# Patient Record
Sex: MALE | Race: OTHER | URBAN - METROPOLITAN AREA
[De-identification: names, ages, dates, MRNs, and addresses within clinical notes are randomized per-mention and may not be internally consistent; named-entity substitution may affect disease eponyms.]

---

## 2022-01-25 ENCOUNTER — TELEPHONE (OUTPATIENT)
Dept: FAMILY MEDICINE CLINIC | Age: 42
End: 2022-01-25

## 2022-01-25 NOTE — TELEPHONE ENCOUNTER
----- Message from Christine Chang sent at 2022  5:53 PM EST -----  Subject: Appointment Request    Reason for Call: New Patient Request Appointment    QUESTIONS  Type of Appointment? New Patient/New to Provider  Reason for appointment request? No appointments available during search  Additional Information for Provider? pt would like to get established as   pt at this practice please advise  ---------------------------------------------------------------------------  --------------  CALL BACK INFO  What is the best way for the office to contact you? OK to leave message on   voicemail  Preferred Call Back Phone Number? [phone]  ---------------------------------------------------------------------------  --------------  SCRIPT ANSWERS  Relationship to Patient? Self  Specialty Confirmation? Primary Care  Is this the first appointment to establish care for a ? No  Have you been diagnosed with, awaiting test results for, or told that you   are suspected of having COVID-19 (Coronavirus)? (If patient has tested   negative or was tested as a requirement for work, school, or travel and   not based on symptoms, answer no)? No  Within the past two weeks have you developed any of the following symptoms   (answer no if symptoms have been present longer than 2 weeks or began   more than 2 weeks ago)? Fever or Chills, Cough, Shortness of breath or   difficulty breathing, Loss of taste or smell, Sore throat, Nasal   congestion, Sneezing or runny nose, Fatigue or generalized body aches   (answer no if pain is specific to a body part e.g. back pain), Diarrhea,   Headache? No  Have you had close contact with someone with COVID-19 in the last 14 days? No  (Service Expert  click yes below to proceed with [facility] As Usual   Scheduling)?  Yes

## 2022-02-01 ENCOUNTER — OFFICE VISIT (OUTPATIENT)
Dept: FAMILY MEDICINE CLINIC | Age: 42
End: 2022-02-01
Payer: COMMERCIAL

## 2022-02-01 VITALS
HEIGHT: 67 IN | TEMPERATURE: 98.2 F | WEIGHT: 175.2 LBS | BODY MASS INDEX: 27.5 KG/M2 | HEART RATE: 100 BPM | DIASTOLIC BLOOD PRESSURE: 91 MMHG | OXYGEN SATURATION: 98 % | SYSTOLIC BLOOD PRESSURE: 121 MMHG

## 2022-02-01 DIAGNOSIS — E11.9 TYPE 2 DIABETES MELLITUS WITHOUT COMPLICATION, UNSPECIFIED WHETHER LONG TERM INSULIN USE (HCC): Primary | ICD-10-CM

## 2022-02-01 DIAGNOSIS — Z87.891 PERSONAL HISTORY OF TOBACCO USE, PRESENTING HAZARDS TO HEALTH: ICD-10-CM

## 2022-02-01 LAB — HBA1C MFR BLD: 8.4 %

## 2022-02-01 PROCEDURE — 83036 HEMOGLOBIN GLYCOSYLATED A1C: CPT | Performed by: FAMILY MEDICINE

## 2022-02-01 PROCEDURE — 99406 BEHAV CHNG SMOKING 3-10 MIN: CPT | Performed by: FAMILY MEDICINE

## 2022-02-01 PROCEDURE — 99203 OFFICE O/P NEW LOW 30 MIN: CPT | Performed by: FAMILY MEDICINE

## 2022-02-01 PROCEDURE — 3052F HG A1C>EQUAL 8.0%<EQUAL 9.0%: CPT | Performed by: FAMILY MEDICINE

## 2022-02-01 RX ORDER — SEMAGLUTIDE 1.34 MG/ML
INJECTION, SOLUTION SUBCUTANEOUS
Qty: 1.5 ML | Refills: 3 | Status: SHIPPED
Start: 2022-02-01 | End: 2022-07-11 | Stop reason: SDUPTHER

## 2022-02-01 RX ORDER — ROSUVASTATIN CALCIUM 10 MG/1
10 TABLET, COATED ORAL DAILY
COMMUNITY
End: 2022-09-19

## 2022-02-01 RX ORDER — SEMAGLUTIDE 1.34 MG/ML
INJECTION, SOLUTION SUBCUTANEOUS
COMMUNITY
Start: 2021-11-17 | End: 2022-02-01 | Stop reason: SDUPTHER

## 2022-02-01 RX ORDER — ATORVASTATIN CALCIUM 20 MG/1
20 TABLET, FILM COATED ORAL DAILY
Qty: 30 TABLET | Refills: 3 | Status: SHIPPED
Start: 2022-02-01 | End: 2022-11-01 | Stop reason: SDUPTHER

## 2022-02-01 SDOH — ECONOMIC STABILITY: FOOD INSECURITY: WITHIN THE PAST 12 MONTHS, YOU WORRIED THAT YOUR FOOD WOULD RUN OUT BEFORE YOU GOT MONEY TO BUY MORE.: NEVER TRUE

## 2022-02-01 SDOH — ECONOMIC STABILITY: FOOD INSECURITY: WITHIN THE PAST 12 MONTHS, THE FOOD YOU BOUGHT JUST DIDN'T LAST AND YOU DIDN'T HAVE MONEY TO GET MORE.: NEVER TRUE

## 2022-02-01 ASSESSMENT — PATIENT HEALTH QUESTIONNAIRE - PHQ9
SUM OF ALL RESPONSES TO PHQ QUESTIONS 1-9: 0
SUM OF ALL RESPONSES TO PHQ9 QUESTIONS 1 & 2: 0
SUM OF ALL RESPONSES TO PHQ QUESTIONS 1-9: 0
DEPRESSION UNABLE TO ASSESS: FUNCTIONAL CAPACITY MOTIVATION LIMITS ACCURACY
SUM OF ALL RESPONSES TO PHQ QUESTIONS 1-9: 0
1. LITTLE INTEREST OR PLEASURE IN DOING THINGS: 0
2. FEELING DOWN, DEPRESSED OR HOPELESS: 0
SUM OF ALL RESPONSES TO PHQ QUESTIONS 1-9: 0

## 2022-02-01 ASSESSMENT — ENCOUNTER SYMPTOMS
VOMITING: 0
SORE THROAT: 0
TROUBLE SWALLOWING: 0
CONSTIPATION: 0
NAUSEA: 0
DIARRHEA: 0
ABDOMINAL PAIN: 0
COUGH: 0

## 2022-02-01 ASSESSMENT — SOCIAL DETERMINANTS OF HEALTH (SDOH): HOW HARD IS IT FOR YOU TO PAY FOR THE VERY BASICS LIKE FOOD, HOUSING, MEDICAL CARE, AND HEATING?: NOT HARD AT ALL

## 2022-02-01 NOTE — PROGRESS NOTES
[facility]  Family Medicine Residency  Phone: [phone]  Fax: [phone]    Patient:  David Vanegas 37 y.o. male                                 Date of Service: 2/1/22                            Chiefcomplaint:   Chief Complaint   Patient presents with    Establish Care       History of Present Illness: The patient is a 37 y.o. male  presented to the clinic with complaints as above. Here to establish  Recently moved from [place]     Hx of DM 2( dx in 2018)  On metformin 1000 mg  BID  Rosuvastatin 10 mg   And ozempic  He stated he was being noin-compliant with all his meds with all moving cities for last 2 months. Hx of asthma  Only on ventolin   Uses once monthly or even less     Works as    fhx : dm in mom and sister, seizures in mom  social drinker and social smoker( however stated he recently has been smoking more than his usual about 2 cig daily, since last month     Review of Systems:   Review of Systems   Constitutional: Negative for chills and fever. HENT: Negative for congestion, sore throat and trouble swallowing. Respiratory: Negative for cough. Cardiovascular: Negative for chest pain and leg swelling. Gastrointestinal: Negative for abdominal pain, constipation, diarrhea, nausea and vomiting. Genitourinary: Negative for difficulty urinating. Musculoskeletal: Negative for arthralgias and myalgias. Skin: Negative for rash and wound. Neurological: Negative for dizziness and headaches. Psychiatric/Behavioral: Negative for agitation. Past Medical History:  History reviewed. No pertinent past medical history. Past Surgical History:    History reviewed. No pertinent surgical history. Allergies:    Patient has no known allergies.     Social History:   Social History     Socioeconomic History    Marital status:      Spouse name: Not on file    Number of children: Not on file    Years of education: Not on file    Highest education level: Not on file   Occupational History    Not on file   Tobacco Use    Smoking status: Current Some Day Smoker     Packs/day: 0.25     Types: Cigarettes    Smokeless tobacco: Never Used   Substance and Sexual Activity    Alcohol use: Not Currently    Drug use: Never    Sexual activity: Not on file   Other Topics Concern    Not on file   Social History Narrative    Not on file     Social Determinants of Health     Financial Resource Strain: Low Risk     Difficulty of Paying Living Expenses: Not hard at all   Food Insecurity: No Food Insecurity    Worried About [address] in the Last Year: Never true    [facility] in the Last Year: Never true   Transportation Needs:     Lack of Transportation (Medical): Not on file    Lack of Transportation (Non-Medical): Not on file   Physical Activity:     Days of Exercise per Week: Not on file    Minutes of Exercise per Session: Not on file   Stress:     Feeling of Stress : Not on file   Social Connections:     Frequency of Communication with Friends and Family: Not on file    Frequency of Social Gatherings with Friends and Family: Not on file    Attends Protestant Services: Not on file    Active Member of [address] [place] [address] or Organizations: Not on file    Attends Club or Organization Meetings: Not on file    Marital Status: Not on file   Intimate Partner Violence:     Fear of Current or Ex-Partner: Not on file    Emotionally Abused: Not on file    Physically Abused: Not on file    Sexually Abused: Not on file   Housing Stability:     Unable to Pay for Housing in the Last Year: Not on file    Number of [place] in the Last Year: Not on file    Unstable Housing in the Last Year: Not on file        Family History:   History reviewed. No pertinent family history.     Physical Exam:    Vitals: BP (!) 121/91   Pulse 100   Temp 98.2 °F (36.8 °C)   Ht 5' 7\" (1.702 m)   Wt 175 lb 3.2 oz (79.5 kg)   SpO2 98%   BMI 27.44 kg/m²   BP Readings from Last 3 Encounters:   02/01/22 (!) 121/91     General Appearance: Well developed, awake, alert, oriented, no acute distress  HEENT: Normocephalic,atraumatic. PERRL, EOM's intact, EAC without erythema or swelling, no pallor or icterus. Neck: Supple, symmetrical, trachea midline. No JVD. Chest wall/Lung: Clear to auscultation  bilaterally,  respirations unlabored. No ronchi/wheezing/rales  Heart[de-identified]  Regular rate and rhythm, S1and S2 normal, no murmur, rub or gallop. Abdomen: Soft, non-tender, bowel sounds normoactive, no masses, no organomegaly  Extremities:  Extremities normal, atraumatic, no cyanosis. edema. Skin: Skin color, texture, turgor normal, no rashes or lesions  Musculokeletal: ROM grossly normal in all joints of extremities, no obvious joint swelling. Lymph nodes: no lymph node enlargement appreciated  Neurologic:   Alert&Oriented. Normal gait and coordination  No focal neurological deficits appreaciated         Psychiatric: has a normal mood and affect. Behavior is normal.       Assessment and Plan:         1. Type 2 diabetes mellitus without complication, unspecified whether long term insulin use (HCC)  Will do some labs today  Will refill metformin, ozempic   Will change crestor to lipitor 20 mg today  A1C : 8.4( previous was 6.7)   Pt encouraged to check his glucose at home and log readings.   - POCT glycosylated hemoglobin (Hb A1C)  - LIPID PANEL; Future  - COMPREHENSIVE METABOLIC PANEL; Future  - MICROALBUMIN / CREATININE URINE RATIO; Future    2. Personal history of tobacco use, presenting hazards to health  Pt counseled and offered resources available   Pt is willing to take steps and wean   - DE TOBACCO USE CESSATION INTERMEDIATE 3-10 MINUTES [16512]      Return to Office: Return in about 3 months (around 5/1/2022) for DM check. I encourage further reading and education about your health conditions.   Information on many healthconditions is provided by the American Academy of Family Physicians: https://familydoctor. org/  Please bring any questions to me at your next visit. This document may have been prepared at least partiallythrough the use of voice recognition software. Although effort is taken to assure the accuracy of this document, it is possible that grammatical, syntax,  or spelling errors may occur. Medication List:    Current Outpatient Medications   Medication Sig Dispense Refill    rosuvastatin (CRESTOR) 10 MG tablet Take 10 mg by mouth daily      metFORMIN (GLUCOPHAGE) 1000 MG tablet Take 1 tablet by mouth daily (with breakfast) 60 tablet 0    atorvastatin (LIPITOR) 20 MG tablet Take 1 tablet by mouth daily 30 tablet 3    OZEMPIC, 0.25 OR 0.5 MG/DOSE, 2 MG/1.5ML SOPN ADMINISTER 0.25 MG UNDER THE SKIN ONCE A WEEK 1.5 mL 3     No current facility-administered medications for this visit.         Aki Lujan MD

## 2022-02-01 NOTE — PROGRESS NOTES
[facility]  Precepting Note    Subjective:  Moved to area    Diabetes mellitus  Follow-up  Metformin and ozempic but has been less compliant with meds lately due to the move. A1c up to 8s from 6s normally. He hasnt really been taking   Lab Results   Component Value Date    LABA1C 8.4 02/01/2022     No results found for: GLUF, LABMICR, LDLCALC, CREATININE  Wt Readings from Last 3 Encounters:   02/01/22 175 lb 3.2 oz (79.5 kg)     Used to take a statin    Asthma  Intermittent  Alb once a month or so   Recently started smoking 2 cigs aday due to stress from moving. . ROS otherwise negative     Past medical, surgical, family and social history were reviewed, non-contributory, and unchanged unless otherwise stated. Objective:    BP (!) 121/91   Pulse 100   Temp 98.2 °F (36.8 °C)   Ht 5' 7\" (1.702 m)   Wt 175 lb 3.2 oz (79.5 kg)   SpO2 98%   BMI 27.44 kg/m²     Exam is as noted by resident with the following changes, additions or corrections:    General:  NAD; alert & oriented x 3   Heart:  RRR, no murmurs, gallops, or rubs. Lungs:  CTA bilaterally, no wheeze, rales or rhonchi  Abd: bowel sounds present, nontender, nondistended, no masses  Extrem:  No clubbing, cyanosis, or edema    Assessment/Plan:  DM  Labs  Encourage compliance with meds. Worsened BS control with inc stresses and a recent move. Add meds back, stepwise.   smoking cessation. Asthma  Stable controlled. Attending Physician Statement  I have reviewed the chart, including any radiology or labs. I have discussed the case, including pertinent history and exam findings with the resident. I agree with the assessment, plan and orders as documented by the resident. Please refer to the resident note for additional information.       Electronically signed by Karine Florez MD on 2/1/2022 at 9:44 AM

## 2022-02-02 ENCOUNTER — NURSE ONLY (OUTPATIENT)
Dept: FAMILY MEDICINE CLINIC | Age: 42
End: 2022-02-02
Payer: COMMERCIAL

## 2022-02-02 DIAGNOSIS — E11.9 TYPE 2 DIABETES MELLITUS WITHOUT COMPLICATION, UNSPECIFIED WHETHER LONG TERM INSULIN USE (HCC): ICD-10-CM

## 2022-02-02 LAB
ALBUMIN SERPL-MCNC: 4.5 G/DL (ref 3.5–5.2)
ALP BLD-CCNC: 66 U/L (ref 40–129)
ALT SERPL-CCNC: 18 U/L (ref 0–40)
ANION GAP SERPL CALCULATED.3IONS-SCNC: 14 MMOL/L (ref 7–16)
AST SERPL-CCNC: 21 U/L (ref 0–39)
BILIRUB SERPL-MCNC: 0.5 MG/DL (ref 0–1.2)
BUN BLDV-MCNC: 12 MG/DL (ref 6–20)
CALCIUM SERPL-MCNC: 9.6 MG/DL (ref 8.6–10.2)
CHLORIDE BLD-SCNC: 102 MMOL/L (ref 98–107)
CHOLESTEROL, TOTAL: 201 MG/DL (ref 0–199)
CO2: 23 MMOL/L (ref 22–29)
CREAT SERPL-MCNC: 1 MG/DL (ref 0.7–1.2)
CREATININE URINE: 209 MG/DL (ref 40–278)
GFR AFRICAN AMERICAN: >60
GFR NON-AFRICAN AMERICAN: >60 ML/MIN/1.73
GLUCOSE BLD-MCNC: 155 MG/DL (ref 74–99)
HDLC SERPL-MCNC: 47 MG/DL
LDL CHOLESTEROL CALCULATED: 113 MG/DL (ref 0–99)
MICROALBUMIN UR-MCNC: 36.4 MG/L
MICROALBUMIN/CREAT UR-RTO: 17.4 (ref 0–30)
POTASSIUM SERPL-SCNC: 4.3 MMOL/L (ref 3.5–5)
SODIUM BLD-SCNC: 139 MMOL/L (ref 132–146)
TOTAL PROTEIN: 7.7 G/DL (ref 6.4–8.3)
TRIGL SERPL-MCNC: 206 MG/DL (ref 0–149)
VLDLC SERPL CALC-MCNC: 41 MG/DL

## 2022-02-02 PROCEDURE — 36415 COLL VENOUS BLD VENIPUNCTURE: CPT | Performed by: FAMILY MEDICINE

## 2022-02-02 NOTE — PATIENT INSTRUCTIONS
Stopping Smoking: Care Instructions  Your Care Instructions     Cigarette smokers crave the nicotine in cigarettes. Giving it up is much harder than simply changing a habit. Your body has to stop craving the nicotine. It is hard to quit, but you can do it. There are many tools that people use to quit smoking. You may find that combining tools works best for you. There are several steps to quitting. First you get ready to quit. Then you get support to help you. After that, you learn new skills and behaviors to become a nonsmoker. For many people, a necessary step is getting and using medicine. Your doctor will help you set up the plan that best meets your needs. You may want to attend a smoking cessation program to help you quit smoking. When you choose a program, look for one that has proven success. Ask your doctor for ideas. You will greatly increase your chances of success if you take medicine as well as get counseling or join a cessation program.  Some of the changes you feel when you first quit tobacco are uncomfortable. Your body will miss the nicotine at first, and you may feel short-tempered and grumpy. You may have trouble sleeping or concentrating. Medicine can help you deal with these symptoms. You may struggle with changing your smoking habits and rituals. The last step is the tricky one: Be prepared for the smoking urge to continue for a time. This is a lot to deal with, but keep at it. You will feel better. Follow-up care is a key part of your treatment and safety. Be sure to make and go to all appointments, and call your doctor if you are having problems. It's also a good idea to know your test results and keep a list of the medicines you take. How can you care for yourself at home? · Ask your family, friends, and coworkers for support. You have a better chance of quitting if you have help and support.   · Join a support group, such as Nicotine Anonymous, for people who are trying to quit smoking. · Consider signing up for a smoking cessation program, such as the American Lung Association's Freedom from Smoking program.  · Get text messaging support. Go to the website at www.smokefree. gov to sign up for the [facility] program.  · Set a quit date. Pick your date carefully so that it is not right in the middle of a big deadline or stressful time. Once you quit, do not even take a puff. Get rid of all ashtrays and lighters after your last cigarette. Clean your house and your clothes so that they do not smell of smoke. · Learn how to be a nonsmoker. Think about ways you can avoid those things that make you reach for a cigarette. ? Avoid situations that put you at greatest risk for smoking. For some people, it is hard to have a drink with friends without smoking. For others, they might skip a coffee break with coworkers who smoke. ? Change your daily routine. Take a different route to work or eat a meal in a different place. · Cut down on stress. Calm yourself or release tension by doing an activity you enjoy, such as reading a book, taking a hot bath, or gardening. · Talk to your doctor or pharmacist about nicotine replacement therapy, which replaces the nicotine in your body. You still get nicotine but you do not use tobacco. Nicotine replacement products help you slowly reduce the amount of nicotine you need. These products come in several forms, many of them available over-the-counter:  ? Nicotine patches  ? Nicotine gum and lozenges  ? Nicotine inhaler  · Ask your doctor about bupropion (Wellbutrin) or varenicline (Chantix), which are prescription medicines. They do not contain nicotine. They help you by reducing withdrawal symptoms, such as stress and anxiety. · Some people find hypnosis, acupuncture, and massage helpful for ending the smoking habit. · Eat a healthy diet and get regular exercise. Having healthy habits will help your body move past its craving for nicotine.   · Be prepared to keep trying. Most people are not successful the first few times they try to quit. Do not get mad at yourself if you smoke again. Make a list of things you learned and think about when you want to try again, such as next week, next month, or next year. Where can you learn more? Go to https://chpearlette.VaporWire. org and sign in to your FlyClip account. Enter A514 in the ShopLogic box to learn more about \"Stopping Smoking: Care Instructions. \"     If you do not have an account, please click on the \"Sign Up Now\" link. Current as of: February 11, 2021               Content Version: 13.1  © 2006-2021 Healthwise, Incorporated. Care instructions adapted under license by [facility] ([facility]). If you have questions about a medical condition or this instruction, always ask your healthcare professional. [address] any warranty or liability for your use of this information.

## 2022-02-08 ENCOUNTER — TELEPHONE (OUTPATIENT)
Dept: FAMILY MEDICINE CLINIC | Age: 42
End: 2022-02-08

## 2022-04-25 NOTE — TELEPHONE ENCOUNTER
Received denial for the Ozempic. Plan prefers metformin, attempted prior authorization twice. Denied both times.

## 2022-05-04 NOTE — TELEPHONE ENCOUNTER
Please inform patient to start taking metformin 1000mg BID and try to bring BG logs during his appt. We can discuss that time if need to add another agent since ozempic was not approved.

## 2022-07-11 ENCOUNTER — OFFICE VISIT (OUTPATIENT)
Dept: FAMILY MEDICINE CLINIC | Age: 42
End: 2022-07-11
Payer: COMMERCIAL

## 2022-07-11 VITALS
OXYGEN SATURATION: 98 % | TEMPERATURE: 97.2 F | WEIGHT: 178 LBS | HEART RATE: 90 BPM | HEIGHT: 67 IN | SYSTOLIC BLOOD PRESSURE: 125 MMHG | BODY MASS INDEX: 27.94 KG/M2 | DIASTOLIC BLOOD PRESSURE: 83 MMHG

## 2022-07-11 DIAGNOSIS — E11.9 TYPE 2 DIABETES MELLITUS WITHOUT COMPLICATION, UNSPECIFIED WHETHER LONG TERM INSULIN USE (HCC): Primary | ICD-10-CM

## 2022-07-11 LAB — HBA1C MFR BLD: 11.2 %

## 2022-07-11 PROCEDURE — 3046F HEMOGLOBIN A1C LEVEL >9.0%: CPT | Performed by: FAMILY MEDICINE

## 2022-07-11 PROCEDURE — 83036 HEMOGLOBIN GLYCOSYLATED A1C: CPT | Performed by: FAMILY MEDICINE

## 2022-07-11 PROCEDURE — 99214 OFFICE O/P EST MOD 30 MIN: CPT | Performed by: FAMILY MEDICINE

## 2022-07-11 RX ORDER — SEMAGLUTIDE 1.34 MG/ML
INJECTION, SOLUTION SUBCUTANEOUS
Qty: 1.5 ML | Refills: 3 | Status: SHIPPED
Start: 2022-07-11 | End: 2022-09-19 | Stop reason: SDUPTHER

## 2022-07-11 RX ORDER — LISINOPRIL 5 MG/1
5 TABLET ORAL DAILY
Qty: 90 TABLET | Refills: 1 | Status: SHIPPED
Start: 2022-07-11 | End: 2022-09-19

## 2022-07-11 ASSESSMENT — ENCOUNTER SYMPTOMS
VOMITING: 0
ABDOMINAL PAIN: 0
NAUSEA: 0
SORE THROAT: 0
CONSTIPATION: 0
COUGH: 0
DIARRHEA: 0
TROUBLE SWALLOWING: 0

## 2022-07-11 NOTE — PROGRESS NOTES
7/11/2022    Tracy Rodriguez is a 37 y.o. malehere for:    HPI:    Here for DM fu  Wife present  Pt not taking any meds at home - metformin   Pt is not taking statin as well  A1C today 11.4 which has worsened from last time   In the past he preferred to stay on metformin  No blurry vision   No polyuria  Patient has been experiencing increasing thirst  Hx of suicidal thoughts with Chrystine Mings worked well in the past  Insurance did not work ozempic  Do not want insulin at this time   lantus did not work in the past      BP Readings from Last 3 Encounters:   07/11/22 125/83   02/01/22 (!) 121/91     Current Outpatient Medications   Medication Sig Dispense Refill    OZEMPIC, 0.25 OR 0.5 MG/DOSE, 2 MG/1.5ML SOPN ADMINISTER 0.5 MG UNDER THE SKIN ONCE A WEEK 1.5 mL 3    lisinopril (PRINIVIL;ZESTRIL) 5 MG tablet Take 1 tablet by mouth daily 90 tablet 1    rosuvastatin (CRESTOR) 10 MG tablet Take 10 mg by mouth daily      metFORMIN (GLUCOPHAGE) 1000 MG tablet Take 1 tablet by mouth daily (with breakfast) 60 tablet 0    atorvastatin (LIPITOR) 20 MG tablet Take 1 tablet by mouth daily (Patient not taking: Reported on 7/11/2022) 30 tablet 3     No current facility-administered medications for this visit. No Known Allergies    Past Medical & Surgical History:  No past medical history on file. No past surgical history on file. Family History:  No family history on file. Social History:  Social History     Tobacco Use    Smoking status: Current Some Day Smoker     Packs/day: 0.25     Types: Cigarettes    Smokeless tobacco: Never Used   Substance Use Topics    Alcohol use: Not Currently         There is no immunization history on file for this patient. Review of Systems   Constitutional: Negative for chills and fever. HENT: Negative for congestion, sore throat and trouble swallowing. Respiratory: Negative for cough. Cardiovascular: Negative for chest pain and leg swelling. Gastrointestinal: Negative for abdominal pain, constipation, diarrhea, nausea and vomiting. Endocrine: Positive for polydipsia. Genitourinary: Negative for difficulty urinating. Musculoskeletal: Negative for arthralgias and myalgias. Skin: Negative for rash and wound. Neurological: Negative for dizziness and headaches. Psychiatric/Behavioral: Negative for agitation. VS:  /83   Pulse 90   Temp 97.2 °F (36.2 °C)   Ht 5' 7\" (1.702 m)   Wt 178 lb (80.7 kg)   SpO2 98%   BMI 27.88 kg/m²     Physical Exam  Constitutional:       Appearance: Normal appearance. HENT:      Head: Normocephalic. Nose: Nose normal. No rhinorrhea. Eyes:      General: No scleral icterus. Conjunctiva/sclera: Conjunctivae normal.   Cardiovascular:      Rate and Rhythm: Normal rate and regular rhythm. Pulses: Normal pulses. Heart sounds: Normal heart sounds. No murmur heard. Pulmonary:      Breath sounds: Normal breath sounds. No wheezing, rhonchi or rales. Abdominal:      General: Abdomen is flat. Bowel sounds are normal.   Musculoskeletal:      Right lower leg: No edema. Left lower leg: No edema. Skin:     General: Skin is warm. Findings: No rash. Comments:  monofilament negative   Neurological:      General: No focal deficit present. Mental Status: He is alert. Assessment/Plan:  1.  Type 2 diabetes mellitus without complication, unspecified whether long term insulin use (HCC)  Discussed in detail the importance of medication compliance  Discussed applications of diabetes  A1c 11.4  At this time patient is poorly controlled  Patient has not taking any of the meds prescribed  We will try another prior Auth for Ozempic because that is patient's preference at this time  Patient does not want to try Lantus  Monofilament negative today  We will add lisinopril 5 mg for diabetic nephropathy  Encouraged importance of statins since diabetes can be equivalent of coronary artery disease  Patient is due for eye exam  No refills today  Patient has plenty of supplies at home to check sugar    - POCT glycosylated hemoglobin (Hb A1C)      Follow up: We will follow-up in 4 to 6 weeks depending on when ozempic gets approved    Patient agrees with the above stated plan. Kenney received counseling on the following healthy behaviors: nutrition, exercise and medication adherence.     Tommy Davis MD  PGY-3 Family Medicine

## 2022-07-11 NOTE — PROGRESS NOTES
[facility]  [facility]    Subjective:  F/u of DM2- A1C 11.2  On metformin- not taking  Ozempic not covered  Victoza- didn't tolerate  Not taking Statin    ROS otherwise negative     Past medical, surgical, family and social history were reviewed, non-contributory, and unchanged unless otherwise stated. Objective:    /83   Pulse 90   Temp 97.2 °F (36.2 °C)   Ht 5' 7\" (1.702 m)   Wt 178 lb (80.7 kg)   SpO2 98%   BMI 27.88 kg/m²     Exam is as noted by resident with the following changes, additions or corrections:    General:  NAD; alert & oriented x 3   Heart:  RRR, no murmurs, gallops, or rubs. Lungs:  CTA bilaterally, no wheeze, rales or rhonchi  Abd: bowel sounds present, nontender, nondistended, no masses  Extrem:  No clubbing, cyanosis, or edema    Assessment/Plan:  Dm2, uncontrolled. Restart metformin  Microalbuminuria. Start ACEI  Prior auth of Ozempic  Restart Statin  Monitor glucose  F/u as instructed       Attending Physician Statement  I have reviewed the chart, including any radiology or labs. I have discussed the case, including pertinent history and exam findings with the resident. I agree with the assessment, plan and orders as documented by the resident. Please refer to the resident note for additional information.       Electronically signed by Serge Oneill MD on 7/11/2022 at 2:42 PM

## 2022-07-15 ENCOUNTER — TELEPHONE (OUTPATIENT)
Dept: FAMILY MEDICINE CLINIC | Age: 42
End: 2022-07-15

## 2022-07-15 NOTE — TELEPHONE ENCOUNTER
Called pt to inform him that his wife is not listed in his chart as an emergency contact and we do not have a communication release form. With the language barrier he gave me verbal permission to speak with her about his medications for PA and when he comes in next he will fill out the form. Wife will be calling me back.

## 2022-07-15 NOTE — TELEPHONE ENCOUNTER
Patient's wife stopped in office to ask about prior auth for patient to have [address]. Patient has tried all other substitutions and would like to continue taking only Ozempic. She provided a phone number for auth [id number].

## 2022-07-20 NOTE — TELEPHONE ENCOUNTER
Had submitted a PA on 7/12/22; called today to check the status of the authorization. They stated they did not receive my fax or prior auth info.      Upon further checking no authorization was needed and they indicated that they showed a paid claim and that the patient had picked up this medication on 7/15/2022 and patient will be eligible for his refill on 8/8/2022

## 2022-08-02 RX ORDER — FLASH GLUCOSE SENSOR
1 KIT MISCELLANEOUS
Qty: 6 EACH | Refills: 3 | Status: SHIPPED
Start: 2022-08-02 | End: 2022-11-01 | Stop reason: SDUPTHER

## 2022-08-02 RX ORDER — FLASH GLUCOSE SCANNING READER
1 EACH MISCELLANEOUS CONTINUOUS
Qty: 1 EACH | Refills: 0 | Status: SHIPPED
Start: 2022-08-02 | End: 2022-09-19

## 2022-08-02 NOTE — TELEPHONE ENCOUNTER
Patient's wife called stating that she would like the patient to get a [employer] CGM system. I explained that typically the insurance only covers if the patient is on insulin 3 times a day. She would like to try anyway. She also stated that she started the patient back on Toujeo 20 units nightly (not sure who prescribed that) She said she had it at home.      Pended the CGM system

## 2022-09-02 ENCOUNTER — OFFICE VISIT (OUTPATIENT)
Dept: FAMILY MEDICINE CLINIC | Age: 42
End: 2022-09-02
Payer: COMMERCIAL

## 2022-09-02 VITALS
DIASTOLIC BLOOD PRESSURE: 85 MMHG | HEIGHT: 67 IN | TEMPERATURE: 97.4 F | SYSTOLIC BLOOD PRESSURE: 136 MMHG | BODY MASS INDEX: 25.74 KG/M2 | HEART RATE: 96 BPM | WEIGHT: 164 LBS | OXYGEN SATURATION: 100 %

## 2022-09-02 DIAGNOSIS — E11.9 TYPE 2 DIABETES MELLITUS WITHOUT COMPLICATION, UNSPECIFIED WHETHER LONG TERM INSULIN USE (HCC): Primary | ICD-10-CM

## 2022-09-02 PROCEDURE — 3046F HEMOGLOBIN A1C LEVEL >9.0%: CPT | Performed by: FAMILY MEDICINE

## 2022-09-02 PROCEDURE — 99213 OFFICE O/P EST LOW 20 MIN: CPT | Performed by: FAMILY MEDICINE

## 2022-09-02 NOTE — PROGRESS NOTES
9/2/2022    Tracy Rodriguez is a 37 y.o. malehere for:    HPI:    Here for DM fu  Does not check blood glucose at home   He states his fasting runs around 120  He also reports his random blood glucose levels are around 160   Denies any sympt of polyuria, polydipsia,hypoglycemia    Uses Ozempic 0.5 weekly  Using Toujeo 10 U night from [place] daily , initially he was using 20 U and now has been using 10 U  Also taking metformin 1000mg bid  He said he is skipping statin dose since he said he is trying to do more physical activity  Lisinopril : not taking, since he thinks \" he is taking too many meds\" he said he plans not taking it      BP Readings from Last 3 Encounters:   09/02/22 136/85   07/11/22 125/83   02/01/22 (!) 121/91     Current Outpatient Medications   Medication Sig Dispense Refill    Continuous Blood Gluc Sensor (FREESTYLE ESTRELLA 2 SENSOR) MISC 1 each by Does not apply route every 14 days 6 each 3    Continuous Blood Gluc  (FREESTYLE ESTRELLA 2 READER) CARLA 1 each by Does not apply route continuous 1 each 0    OZEMPIC, 0.25 OR 0.5 MG/DOSE, 2 MG/1.5ML SOPN ADMINISTER 0.5 MG UNDER THE SKIN ONCE A WEEK 1.5 mL 3    rosuvastatin (CRESTOR) 10 MG tablet Take 10 mg by mouth daily      metFORMIN (GLUCOPHAGE) 1000 MG tablet Take 1 tablet by mouth daily (with breakfast) 60 tablet 0    lisinopril (PRINIVIL;ZESTRIL) 5 MG tablet Take 1 tablet by mouth daily (Patient not taking: Reported on 9/2/2022) 90 tablet 1    atorvastatin (LIPITOR) 20 MG tablet Take 1 tablet by mouth daily (Patient not taking: Reported on 7/11/2022) 30 tablet 3     No current facility-administered medications for this visit. No Known Allergies    Past Medical & Surgical History:  No past medical history on file. No past surgical history on file. Family History:  No family history on file.     Social History:  Social History     Tobacco Use    Smoking status: Some Days     Packs/day: 0.25     Types: Cigarettes    Smokeless tobacco: Never   Substance Use Topics    Alcohol use: Not Currently         There is no immunization history on file for this patient. Review of Systems   Constitutional:  Negative for chills and fever. HENT:  Negative for congestion, sore throat and trouble swallowing. Respiratory:  Negative for cough. Cardiovascular:  Negative for chest pain and leg swelling. Gastrointestinal:  Negative for abdominal pain, constipation, diarrhea, nausea and vomiting. Genitourinary:  Negative for difficulty urinating. Musculoskeletal:  Negative for arthralgias and myalgias. Skin:  Negative for rash and wound. Neurological:  Negative for dizziness and headaches. Psychiatric/Behavioral:  Negative for agitation. VS:  /85   Pulse 96   Temp 97.4 °F (36.3 °C) (Temporal)   Ht 5' 7\" (1.702 m)   Wt 164 lb (74.4 kg)   SpO2 100%   BMI 25.69 kg/m²     Physical Exam  Constitutional:       Appearance: Normal appearance. HENT:      Head: Normocephalic. Nose: Nose normal. No rhinorrhea. Eyes:      General: No scleral icterus. Conjunctiva/sclera: Conjunctivae normal.   Cardiovascular:      Rate and Rhythm: Normal rate and regular rhythm. Pulses: Normal pulses. Heart sounds: Normal heart sounds. No murmur heard. Pulmonary:      Breath sounds: Normal breath sounds. No wheezing, rhonchi or rales. Abdominal:      General: Abdomen is flat. Bowel sounds are normal.   Musculoskeletal:      Right lower leg: No edema. Left lower leg: No edema. Skin:     General: Skin is warm. Findings: No rash. Neurological:      General: No focal deficit present. Mental Status: He is alert. Assessment/Plan:  1.  Type 2 diabetes mellitus without complication, unspecified whether long term insulin use (HCC)  Continue toujeo 10 U nightly, (gets it from [place])  Continue metformin 1000mg BID  Continue ozempic   Pt refuses to take statin and lisinopril  Educated importance of statin and lisinopril, pt still refusing   Will schedule for A1C check in few weeks. Follow up:  3 months     Patient agrees with the above stated plan. Kenney received counseling on the following healthy behaviors: nutrition, exercise, and medication adherence.     Eusebio Lane MD  PGY-3 Family Medicine sensation is normal and strength is normal.

## 2022-09-02 NOTE — PROGRESS NOTES
S: 37 y.o. male with   Chief Complaint   Patient presents with    Diabetes     Pt is here for DM follow up. He is checking his sugar and he is well controlled. He does not want to take the ACE or the statin. Doing well on the ozempic. O: VS:  height is 5' 7\" (1.702 m) and weight is 164 lb (74.4 kg). His temporal temperature is 97.4 °F (36.3 °C). His blood pressure is 136/85 and his pulse is 96. His oxygen saturation is 100%. BP Readings from Last 3 Encounters:   09/02/22 136/85   07/11/22 125/83   02/01/22 (!) 121/91     See resident note      Impression/Plan:   1) DM - recheck HbA1c in Oct.  Sugar is better controlled. 2) increased ASCVD risk - ed pt importance of statin. Pt does not want to start at this time. 3) minor micoralbuminuria - not significant at this time. Pt not at goal for BP. Advised ACE. Pt does not want to start at this time. Health Maintenance Due   Topic Date Due    COVID-19 Vaccine (1) Never done    Varicella vaccine (1 of 2 - 2-dose childhood series) Never done    Pneumococcal 0-64 years Vaccine (1 - PCV) Never done    Diabetic foot exam  Never done    HIV screen  Never done    Diabetic retinal exam  Never done    Hepatitis C screen  Never done    Hepatitis B vaccine (1 of 3 - Risk 3-dose series) Never done    DTaP/Tdap/Td vaccine (1 - Tdap) Never done    Flu vaccine (1) Never done         Attending Physician Statement  I have discussed the case, including pertinent history and exam findings with the resident. I agree with the documented assessment and plan.       Martha Vines MD

## 2022-09-07 ASSESSMENT — ENCOUNTER SYMPTOMS
SORE THROAT: 0
VOMITING: 0
ABDOMINAL PAIN: 0
CONSTIPATION: 0
COUGH: 0
TROUBLE SWALLOWING: 0
NAUSEA: 0
DIARRHEA: 0

## 2022-09-19 ENCOUNTER — OFFICE VISIT (OUTPATIENT)
Dept: ENDOCRINOLOGY | Age: 42
End: 2022-09-19
Payer: COMMERCIAL

## 2022-09-19 VITALS
WEIGHT: 164 LBS | SYSTOLIC BLOOD PRESSURE: 126 MMHG | BODY MASS INDEX: 25.69 KG/M2 | HEART RATE: 82 BPM | OXYGEN SATURATION: 99 % | DIASTOLIC BLOOD PRESSURE: 87 MMHG

## 2022-09-19 DIAGNOSIS — E55.9 VITAMIN D DEFICIENCY: Primary | ICD-10-CM

## 2022-09-19 DIAGNOSIS — E11.9 TYPE 2 DIABETES MELLITUS WITHOUT COMPLICATION, UNSPECIFIED WHETHER LONG TERM INSULIN USE (HCC): ICD-10-CM

## 2022-09-19 PROCEDURE — 99203 OFFICE O/P NEW LOW 30 MIN: CPT | Performed by: INTERNAL MEDICINE

## 2022-09-19 PROCEDURE — 3046F HEMOGLOBIN A1C LEVEL >9.0%: CPT | Performed by: INTERNAL MEDICINE

## 2022-09-19 RX ORDER — SEMAGLUTIDE 1.34 MG/ML
INJECTION, SOLUTION SUBCUTANEOUS
Qty: 3 ADJUSTABLE DOSE PRE-FILLED PEN SYRINGE | Refills: 3 | Status: SHIPPED
Start: 2022-09-19 | End: 2022-11-01

## 2022-09-19 RX ORDER — INSULIN GLARGINE 300 U/ML
10 INJECTION, SOLUTION SUBCUTANEOUS DAILY
COMMUNITY
End: 2022-11-01

## 2022-09-19 RX ORDER — METFORMIN HYDROCHLORIDE 500 MG/1
2000 TABLET, EXTENDED RELEASE ORAL
Qty: 120 TABLET | Refills: 11 | Status: SHIPPED
Start: 2022-09-19 | End: 2022-11-01 | Stop reason: SDUPTHER

## 2022-09-19 NOTE — PROGRESS NOTES
[address] [facility] Department of Endocrinology Diabetes and Metabolism   [address] [facility] [address]   Phone: [phone]  Fax: [phone]    Date of Service: 9/19/2022  Primary Care Physician: Juan Antonio Beverly MD  Referring physician: No ref. provider found  Provider: Azra Kumar MD    Reason for the visit:  DM type 2     History of Present Illness: The history is provided by the patient. No  was used. Accuracy of the patient data is excellent. Tracy Rodriguez is a very pleasant 37 y.o. male seen today for diabetes management     Tracy Rodriguez was diagnosed with diabetes at age 40  and currently on Metformin 1000 mg daily, Ozempic 0.5 mg/wk    The patient has been checking blood sugar every few weeks   Most recent A1c results summarized below  Lab Results   Component Value Date/Time    LABA1C 11.2 07/11/2022 02:17 PM    LABA1C 8.4 02/01/2022 09:24 AM     Patient has had no hypoglycemic episodes   The patient hasn't been very mindful of what has been eating and wasn't strictly following diabetic diet   I reviewed current medications and the patient has no issues with diabetes medications  Tracy Rodriguez is up to date with eye exam and denied any history of diabetic retinopathy   The patient  performs  own feet care  Microvascular complications:  No Retinopathy, Nephropathy or Neuropathy   Macrovascular complications: no CAD   The patient receives Flushot every year    PGM and MGM DM     PAST MEDICAL HISTORY   No past medical history on file. PAST SURGICAL HISTORY   No past surgical history on file. SOCIAL HISTORY   Tobacco:   reports that he has been smoking cigarettes. He has been smoking an average of .25 packs per day. He has never used smokeless tobacco.  Alcohol:   reports that he does not currently use alcohol. Drugs:   reports no history of drug use. FAMILY HISTORY   No family history on file.     ALLERGIES AND DRUG REACTIONS   No Known Allergies    CURRENT MEDICATIONS   Current Outpatient Medications   Medication Sig Dispense Refill    Insulin Glargine, 1 Unit Dial, (TOUJEO SOLOSTAR) 300 UNIT/ML SOPN Inject 10 Units into the skin daily      OZEMPIC, 0.25 OR 0.5 MG/DOSE, 2 MG/1.5ML SOPN ADMINISTER 0.5 MG UNDER THE SKIN ONCE A WEEK 1.5 mL 3    metFORMIN (GLUCOPHAGE) 1000 MG tablet Take 1 tablet by mouth daily (with breakfast) 60 tablet 0    atorvastatin (LIPITOR) 20 MG tablet Take 1 tablet by mouth daily 30 tablet 3    Continuous Blood Gluc Sensor (FREESTYLE ESTRELLA 2 SENSOR) MISC 1 each by Does not apply route every 14 days (Patient not taking: Reported on 9/19/2022) 6 each 3    Continuous Blood Gluc  (FREESTYLE ESTRELLA 2 READER) CARLA 1 each by Does not apply route continuous (Patient not taking: Reported on 9/19/2022) 1 each 0     No current facility-administered medications for this visit. Review of Systems  Constitutional: No fever, no chills, no diaphoresis, no generalized weakness. HEENT: No blurred vision, No sore throat, no ear pain, no hair loss  Neck: denied any neck swelling, difficulty swallowing,   Cardio-pulmonary: No CP, SOB or palpitation, No orthopnea or PND. No cough or wheezing. GI: No N/V/D, no constipation, No abdominal pain, no melena or hematochezia   : Denied any dysuria, hematuria, flank pain, discharge, or incontinence. Skin: denied any rash, ulcer, Hirsute, or hyperpigmentation. MSK: denied any joint deformity, joint pain/swelling, muscle pain, or back pain.   Neuro: no numbness, no tingling, no weakness, _    OBJECTIVE    /87   Pulse 82   Wt 164 lb (74.4 kg)   SpO2 99%   BMI 25.69 kg/m²   BP Readings from Last 4 Encounters:   09/19/22 126/87   09/02/22 136/85   07/11/22 125/83   02/01/22 (!) 121/91     Wt Readings from Last 6 Encounters:   09/19/22 164 lb (74.4 kg)   09/02/22 164 lb (74.4 kg)   07/11/22 178 lb (80.7 kg)   02/01/22 175 lb 3.2 oz (79.5 kg)       Physical examination:  General: awake alert, oriented x3, no abnormal position or movements. HEENT: normocephalic non-traumatic, no exophthalmos   Neck: supple, no LN enlargement, no thyromegaly, no thyroid tenderness, no JVD. Pulm: Clear equal air entry no added sounds, no wheezing or rhonchi    CVS: S1 + S2, no murmur, no heave. Dorsalis pedis pulse palpable   Abd: soft lax, no tenderness, no organomegaly, audible bowel sounds. Skin: warm, no lesions, no rash.  No callus, no Ulcers, No acanthosis nigricans  Musculoskeletal: No back tenderness, no kyphosis/scoliosis    Neuro: CN intact, Monofilament sensation present  bilateral , muscle power normal  Psych: normal mood, and affect    Review of Laboratory Data:  I personally reviewed the following lab:  No results found for: WBC, RBC, HGB, HCT, MCV, MCH, MCHC, RDW, PLT, MPV, GRANULOCYTES, SONAL, BANDS   Lab Results   Component Value Date/Time     02/02/2022 12:00 PM    K 4.3 02/02/2022 12:00 PM    CO2 23 02/02/2022 12:00 PM    BUN 12 02/02/2022 12:00 PM    CREATININE 1.0 02/02/2022 12:00 PM    CALCIUM 9.6 02/02/2022 12:00 PM    LABGLOM >60 02/02/2022 12:00 PM    GFRAA >60 02/02/2022 12:00 PM      No results found for: TSH, T4FREE, P6VTWIB, FT3, E9OENGZ, TSI, TPOABS, THGAB  Lab Results   Component Value Date/Time    LABA1C 11.2 07/11/2022 02:17 PM    GLUCOSE 155 02/02/2022 12:00 PM    MALBCR 17.4 02/02/2022 12:00 PM    LABMICR 36.4 02/02/2022 12:00 PM    LABCREA 209 02/02/2022 12:00 PM     Lab Results   Component Value Date/Time    LABA1C 11.2 07/11/2022 02:17 PM    LABA1C 8.4 02/01/2022 09:24 AM     Lab Results   Component Value Date/Time    TRIG 206 02/02/2022 12:00 PM    HDL 47 02/02/2022 12:00 PM    LDLCALC 113 02/02/2022 12:00 PM    CHOL 201 02/02/2022 12:00 PM     No results found for: VITD25    ASSESSMENT & RECOMMENDATIONS   Ni Chew, a 37 y.o.-old male seen in for the following issues     Diabetes Mellitus Type 2     Patient's diabetes is uncontrol Change Metformin extended release 500 mg 4 tablets daily with meals   Continue Toujeo 10 units daily   Continue Ozempic 0.5 mg/wk   Use freestyle Abbi system and send us sugar log in a wk   The patient was advised to check blood sugars 4 times a day before meals and at bedtime and send BS readings to our office in a week. Discussed with patient A1c and blood sugar goals   Patient will need routine diabetes maintenance and prevention  Diabetes labs before next visit     Dietary noncompliance  Discussed with patient the importance of eating consistent carbohydrate meals, avoiding high glycemic index food. Also, discussed with patient the risk and negative consequences of dietary noncompliance on blood glucose control, blood pressure and weight      I personally reviewed external notes from PCP and other patient's care team providers, and personally interpreted labs associated with the above diagnosis. I also ordered labs to further assess and manage the above addressed medical conditions. Return in about 6 weeks (around 10/31/2022) for DM type 2, VitD deficiency. The above issues were reviewed with the patient who understood and agreed with the plan. Thank you for allowing us to participate in the care of this patient. Please do not hesitate to contact us with any additional questions. Diagnosis Orders   1. Vitamin D deficiency  Vitamin D 25 Hydroxy      2.  Type 2 diabetes mellitus without complication, unspecified whether long term insulin use (HCC)  OZEMPIC, 0.25 OR 0.5 MG/DOSE, 2 MG/1.5ML SOPN    C-Peptide    GLUTAMIC ACID DECARBOXYLASE    Glucose, Random    Comprehensive Metabolic Panel    Hemoglobin A1C    Lipid Panel    Microalbumin / Creatinine Urine Ratio          Pablo Wright MD  Endocrinologist, [facility]   [address] [place] [address], [address] [place] [address],[facility] [id number]   Phone: [phone]  Fax: [phone]  --------------------------------------------  An electronic signature was used to authenticate this note.  Zac Singh MD on 9/19/2022 at 3:38 PM

## 2022-09-22 DIAGNOSIS — E11.9 TYPE 2 DIABETES MELLITUS WITHOUT COMPLICATION, UNSPECIFIED WHETHER LONG TERM INSULIN USE (HCC): ICD-10-CM

## 2022-09-22 LAB — GLUCOSE BLD-MCNC: 162 MG/DL (ref 74–99)

## 2022-09-29 LAB — C-PEPTIDE: 5.2 NG/ML (ref 0.5–3.3)

## 2022-09-30 LAB — GLUTAMIC ACID DECARB AB: <5 IU/ML (ref 0–5)

## 2022-11-01 ENCOUNTER — OFFICE VISIT (OUTPATIENT)
Dept: ENDOCRINOLOGY | Age: 42
End: 2022-11-01
Payer: COMMERCIAL

## 2022-11-01 VITALS
HEART RATE: 97 BPM | DIASTOLIC BLOOD PRESSURE: 85 MMHG | SYSTOLIC BLOOD PRESSURE: 128 MMHG | OXYGEN SATURATION: 98 % | HEIGHT: 67 IN | WEIGHT: 165 LBS | BODY MASS INDEX: 25.9 KG/M2

## 2022-11-01 DIAGNOSIS — E55.9 VITAMIN D DEFICIENCY: ICD-10-CM

## 2022-11-01 DIAGNOSIS — E11.9 TYPE 2 DIABETES MELLITUS WITHOUT COMPLICATION, UNSPECIFIED WHETHER LONG TERM INSULIN USE (HCC): Primary | ICD-10-CM

## 2022-11-01 LAB — HBA1C MFR BLD: 7 %

## 2022-11-01 PROCEDURE — 83036 HEMOGLOBIN GLYCOSYLATED A1C: CPT | Performed by: INTERNAL MEDICINE

## 2022-11-01 PROCEDURE — 99214 OFFICE O/P EST MOD 30 MIN: CPT | Performed by: INTERNAL MEDICINE

## 2022-11-01 PROCEDURE — 3051F HG A1C>EQUAL 7.0%<8.0%: CPT | Performed by: INTERNAL MEDICINE

## 2022-11-01 RX ORDER — INSULIN GLARGINE 300 U/ML
10 INJECTION, SOLUTION SUBCUTANEOUS DAILY
Qty: 1 ADJUSTABLE DOSE PRE-FILLED PEN SYRINGE | Refills: 11 | Status: CANCELLED | OUTPATIENT
Start: 2022-11-01

## 2022-11-01 RX ORDER — ATORVASTATIN CALCIUM 20 MG/1
20 TABLET, FILM COATED ORAL DAILY
Qty: 90 TABLET | Refills: 3 | Status: SHIPPED | OUTPATIENT
Start: 2022-11-01

## 2022-11-01 RX ORDER — SEMAGLUTIDE 1.34 MG/ML
INJECTION, SOLUTION SUBCUTANEOUS
Qty: 3 ADJUSTABLE DOSE PRE-FILLED PEN SYRINGE | Refills: 3 | Status: SHIPPED | OUTPATIENT
Start: 2022-11-01

## 2022-11-01 RX ORDER — METFORMIN HYDROCHLORIDE 500 MG/1
500 TABLET, EXTENDED RELEASE ORAL
Qty: 90 TABLET | Refills: 3 | Status: SHIPPED | OUTPATIENT
Start: 2022-11-01

## 2022-11-01 RX ORDER — SEMAGLUTIDE 1.34 MG/ML
INJECTION, SOLUTION SUBCUTANEOUS
Qty: 3 ADJUSTABLE DOSE PRE-FILLED PEN SYRINGE | Refills: 3 | Status: CANCELLED | OUTPATIENT
Start: 2022-11-01

## 2022-11-01 RX ORDER — FLASH GLUCOSE SENSOR
1 KIT MISCELLANEOUS
Qty: 2 EACH | Refills: 11 | Status: SHIPPED | OUTPATIENT
Start: 2022-11-01

## 2022-11-01 NOTE — PROGRESS NOTES
[address] [place] [address] Department of Endocrinology Diabetes and Metabolism   [address] [facility] [address]   Phone: [phone]  Fax: [phone]    Date of Service: 11/1/2022  Primary Care Physician: Yanna Escoto MD  Referring physician: No ref. provider found  Provider: uQyen Arshad MD    Reason for the visit:  DM type 2     History of Present Illness: The history is provided by the patient. No  was used. Accuracy of the patient data is excellent. Redd Ziegler is a very pleasant 40 y.o. male seen today for diabetes management     Redd Ziegler was diagnosed with diabetes at age 40  and currently on Metformin  mg daily, Ozempic 0.5 mg/wk   The patient has been checking blood sugar multiple times/day using freestyle Abbi and most readings at goal   Most recent A1c results summarized below  Lab Results   Component Value Date/Time    LABA1C 7.0 11/01/2022 12:21 PM    LABA1C 11.2 07/11/2022 02:17 PM    LABA1C 8.4 02/01/2022 09:24 AM     Patient has had no hypoglycemic episodes   I reviewed current medications and the patient has no issues with diabetes medications  Redd Ziegler is up to date with eye exam and denied any history of diabetic retinopathy   The patient  performs  own feet care  Microvascular complications:  No Retinopathy, Nephropathy or Neuropathy   Macrovascular complications: no CAD   The patient receives Flushot every year    PGM and MGM DM     PAST MEDICAL HISTORY   No past medical history on file. PAST SURGICAL HISTORY   No past surgical history on file. SOCIAL HISTORY   Tobacco:   reports that he has been smoking cigarettes. He has been smoking an average of .25 packs per day. He has never used smokeless tobacco.  Alcohol:   reports that he does not currently use alcohol. Drugs:   reports no history of drug use. FAMILY HISTORY   No family history on file.     ALLERGIES AND DRUG REACTIONS   No Known Allergies    CURRENT MEDICATIONS   Current Outpatient Medications   Medication Sig Dispense Refill    metFORMIN (GLUCOPHAGE-XR) 500 MG extended release tablet Take 1 tablet by mouth daily (with breakfast) 90 tablet 3    Continuous Blood Gluc Sensor (FREESTYLE ETSRELLA 2 SENSOR) MISC 1 each by Does not apply route every 14 days 2 each 11    OZEMPIC, 0.25 OR 0.5 MG/DOSE, 2 MG/1.5ML SOPN ADMINISTER 0.5 MG UNDER THE SKIN ONCE A WEEK 3 Adjustable Dose Pre-filled Pen Syringe 3    atorvastatin (LIPITOR) 20 MG tablet Take 1 tablet by mouth daily 90 tablet 3     No current facility-administered medications for this visit. Review of Systems  Constitutional: No fever, no chills, no diaphoresis, no generalized weakness. HEENT: No blurred vision, No sore throat, no ear pain, no hair loss  Neck: denied any neck swelling, difficulty swallowing,   Cardio-pulmonary: No CP, SOB or palpitation, No orthopnea or PND. No cough or wheezing. GI: No N/V/D, no constipation, No abdominal pain, no melena or hematochezia   : Denied any dysuria, hematuria, flank pain, discharge, or incontinence. Skin: denied any rash, ulcer, Hirsute, or hyperpigmentation. MSK: denied any joint deformity, joint pain/swelling, muscle pain, or back pain. Neuro: no numbness, no tingling, no weakness, _    OBJECTIVE    /85   Pulse 97   Ht 5' 7\" (1.702 m)   Wt 165 lb (74.8 kg)   SpO2 98%   BMI 25.84 kg/m²   BP Readings from Last 4 Encounters:   11/01/22 128/85   09/19/22 126/87   09/02/22 136/85   07/11/22 125/83     Wt Readings from Last 6 Encounters:   11/01/22 165 lb (74.8 kg)   09/19/22 164 lb (74.4 kg)   09/02/22 164 lb (74.4 kg)   07/11/22 178 lb (80.7 kg)   02/01/22 175 lb 3.2 oz (79.5 kg)       Physical examination:  General: awake alert, oriented x3, no abnormal position or movements. HEENT: normocephalic non-traumatic, no exophthalmos   Neck: supple, no LN enlargement, no thyromegaly, no thyroid tenderness, no JVD.   Pulm: Clear equal air entry no added sounds, no wheezing or rhonchi    CVS: S1 + S2, no murmur, no heave. Dorsalis pedis pulse palpable   Abd: soft lax, no tenderness, no organomegaly, audible bowel sounds. Skin: warm, no lesions, no rash.  No callus, no Ulcers, No acanthosis nigricans  Musculoskeletal: No back tenderness, no kyphosis/scoliosis    Neuro: CN intact, Monofilament sensation present  bilateral , muscle power normal  Psych: normal mood, and affect    Review of Laboratory Data:  I personally reviewed the following lab:  No results found for: WBC, RBC, HGB, HCT, MCV, MCH, MCHC, RDW, PLT, MPV, GRANULOCYTES, SONAL, BANDS   Lab Results   Component Value Date/Time     02/02/2022 12:00 PM    K 4.3 02/02/2022 12:00 PM    CO2 23 02/02/2022 12:00 PM    BUN 12 02/02/2022 12:00 PM    CREATININE 1.0 02/02/2022 12:00 PM    CALCIUM 9.6 02/02/2022 12:00 PM    LABGLOM >60 02/02/2022 12:00 PM    GFRAA >60 02/02/2022 12:00 PM      No results found for: TSH, T4FREE, R4MXBUG, FT3, S9BJSAY, TSI, TPOABS, THGAB  Lab Results   Component Value Date/Time    LABA1C 7.0 11/01/2022 12:21 PM    GLUCOSE 162 09/22/2022 03:24 PM    MALBCR 17.4 02/02/2022 12:00 PM    LABMICR 36.4 02/02/2022 12:00 PM    LABCREA 209 02/02/2022 12:00 PM     Lab Results   Component Value Date/Time    LABA1C 7.0 11/01/2022 12:21 PM    LABA1C 11.2 07/11/2022 02:17 PM    LABA1C 8.4 02/01/2022 09:24 AM     Lab Results   Component Value Date/Time    TRIG 206 02/02/2022 12:00 PM    HDL 47 02/02/2022 12:00 PM    LDLCALC 113 02/02/2022 12:00 PM    CHOL 201 02/02/2022 12:00 PM     No results found for: VITD25    ASSESSMENT & RECOMMENDATIONS   [place], a 40 y.o.-old male seen in for the following issues     Diabetes Mellitus Type 2     Improved control, A1c 7% down from 11.2%   Continue Metformin er 500 mg daily, Ozempic 0.5 mg/wk   Continue using freestyle Abbi system   Discussed with patient A1c and blood sugar goals   Patient will need routine diabetes maintenance and prevention  Diabetes labs before next visit     Dietary noncompliance  Discussed with patient the importance of eating consistent carbohydrate meals, avoiding high glycemic index food. Also, discussed with patient the risk and negative consequences of dietary noncompliance on blood glucose control, blood pressure and weight    I personally reviewed external notes from PCP and other patient's care team providers, and personally interpreted labs associated with the above diagnosis. I also ordered labs to further assess and manage the above addressed medical conditions. Return in about 4 months (around 3/1/2023). The above issues were reviewed with the patient who understood and agreed with the plan. Thank you for allowing us to participate in the care of this patient. Please do not hesitate to contact us with any additional questions. Diagnosis Orders   1. Type 2 diabetes mellitus without complication, unspecified whether long term insulin use (HCC)  POCT glycosylated hemoglobin (Hb A1C)    metFORMIN (GLUCOPHAGE-XR) 500 MG extended release tablet    Continuous Blood Gluc Sensor (FREESTYLE ESTRELLA 2 SENSOR) MISC    OZEMPIC, 0.25 OR 0.5 MG/DOSE, 2 MG/1.5ML SOPN    atorvastatin (LIPITOR) 20 MG tablet    Comprehensive Metabolic Panel    Hemoglobin A1C    Lipid Panel    Microalbumin / Creatinine Urine Ratio      2. Vitamin D deficiency  Vitamin D 25 Hydroxy    Comprehensive Metabolic Panel          Malia Menchaca MD  Endocrinologist, [place] [address] [facility]   [address] [place] [address]   Phone: [phone]  Fax: [phone]  --------------------------------------------  An electronic signature was used to authenticate this note.  Deb Padilla MD on 11/1/2022 at 12:41 PM

## 2022-11-20 ENCOUNTER — APPOINTMENT (OUTPATIENT)
Dept: GENERAL RADIOLOGY | Age: 42
End: 2022-11-20
Payer: COMMERCIAL

## 2022-11-20 ENCOUNTER — HOSPITAL ENCOUNTER (EMERGENCY)
Age: 42
Discharge: HOME | End: 2022-11-21
Attending: EMERGENCY MEDICINE
Payer: COMMERCIAL

## 2022-11-20 DIAGNOSIS — J98.01 BRONCHOSPASM: ICD-10-CM

## 2022-11-20 DIAGNOSIS — R07.9 CHEST PAIN, UNSPECIFIED TYPE: Primary | ICD-10-CM

## 2022-11-20 LAB
ALBUMIN SERPL-MCNC: 4.2 G/DL (ref 3.5–5.2)
ALP BLD-CCNC: 85 U/L (ref 40–129)
ALT SERPL-CCNC: 15 U/L (ref 0–40)
ANION GAP SERPL CALCULATED.3IONS-SCNC: 12 MMOL/L (ref 7–16)
AST SERPL-CCNC: 18 U/L (ref 0–39)
BASOPHILS ABSOLUTE: 0.05 E9/L (ref 0–0.2)
BASOPHILS RELATIVE PERCENT: 0.6 % (ref 0–2)
BILIRUB SERPL-MCNC: 0.4 MG/DL (ref 0–1.2)
BUN BLDV-MCNC: 18 MG/DL (ref 6–20)
CALCIUM SERPL-MCNC: 9.6 MG/DL (ref 8.6–10.2)
CHLORIDE BLD-SCNC: 99 MMOL/L (ref 98–107)
CO2: 23 MMOL/L (ref 22–29)
CREAT SERPL-MCNC: 1.1 MG/DL (ref 0.7–1.2)
EOSINOPHILS ABSOLUTE: 0.4 E9/L (ref 0.05–0.5)
EOSINOPHILS RELATIVE PERCENT: 4.5 % (ref 0–6)
GFR SERPL CREATININE-BSD FRML MDRD: >60 ML/MIN/1.73
GLUCOSE BLD-MCNC: 377 MG/DL (ref 74–99)
HCT VFR BLD CALC: 42 % (ref 37–54)
HEMOGLOBIN: 14.3 G/DL (ref 12.5–16.5)
IMMATURE GRANULOCYTES #: 0.02 E9/L
IMMATURE GRANULOCYTES %: 0.2 % (ref 0–5)
LYMPHOCYTES ABSOLUTE: 2.56 E9/L (ref 1.5–4)
LYMPHOCYTES RELATIVE PERCENT: 28.6 % (ref 20–42)
MCH RBC QN AUTO: 28.4 PG (ref 26–35)
MCHC RBC AUTO-ENTMCNC: 34 % (ref 32–34.5)
MCV RBC AUTO: 83.3 FL (ref 80–99.9)
MONOCYTES ABSOLUTE: 0.59 E9/L (ref 0.1–0.95)
MONOCYTES RELATIVE PERCENT: 6.6 % (ref 2–12)
NEUTROPHILS ABSOLUTE: 5.32 E9/L (ref 1.8–7.3)
NEUTROPHILS RELATIVE PERCENT: 59.5 % (ref 43–80)
PDW BLD-RTO: 12.6 FL (ref 11.5–15)
PLATELET # BLD: 184 E9/L (ref 130–450)
PMV BLD AUTO: 10.6 FL (ref 7–12)
POTASSIUM SERPL-SCNC: 4.5 MMOL/L (ref 3.5–5)
RBC # BLD: 5.04 E12/L (ref 3.8–5.8)
SODIUM BLD-SCNC: 134 MMOL/L (ref 132–146)
TOTAL PROTEIN: 7.5 G/DL (ref 6.4–8.3)
TROPONIN, HIGH SENSITIVITY: <6 NG/L (ref 0–11)
WBC # BLD: 8.9 E9/L (ref 4.5–11.5)

## 2022-11-20 PROCEDURE — 94640 AIRWAY INHALATION TREATMENT: CPT

## 2022-11-20 PROCEDURE — 93005 ELECTROCARDIOGRAM TRACING: CPT | Performed by: EMERGENCY MEDICINE

## 2022-11-20 PROCEDURE — 2580000003 HC RX 258: Performed by: EMERGENCY MEDICINE

## 2022-11-20 PROCEDURE — 36415 COLL VENOUS BLD VENIPUNCTURE: CPT

## 2022-11-20 PROCEDURE — 94664 DEMO&/EVAL PT USE INHALER: CPT

## 2022-11-20 PROCEDURE — 6370000000 HC RX 637 (ALT 250 FOR IP): Performed by: EMERGENCY MEDICINE

## 2022-11-20 PROCEDURE — 84484 ASSAY OF TROPONIN QUANT: CPT

## 2022-11-20 PROCEDURE — 85025 COMPLETE CBC W/AUTO DIFF WBC: CPT

## 2022-11-20 PROCEDURE — 80053 COMPREHEN METABOLIC PANEL: CPT

## 2022-11-20 PROCEDURE — 71045 X-RAY EXAM CHEST 1 VIEW: CPT

## 2022-11-20 PROCEDURE — 99285 EMERGENCY DEPT VISIT HI MDM: CPT

## 2022-11-20 RX ORDER — SODIUM CHLORIDE 9 MG/ML
INJECTION, SOLUTION INTRAVENOUS CONTINUOUS
Status: DISCONTINUED | OUTPATIENT
Start: 2022-11-20 | End: 2022-11-21 | Stop reason: HOSPADM

## 2022-11-20 RX ORDER — IPRATROPIUM BROMIDE AND ALBUTEROL SULFATE 2.5; .5 MG/3ML; MG/3ML
1 SOLUTION RESPIRATORY (INHALATION)
Status: COMPLETED | OUTPATIENT
Start: 2022-11-20 | End: 2022-11-20

## 2022-11-20 RX ADMIN — IPRATROPIUM BROMIDE AND ALBUTEROL SULFATE 1 AMPULE: .5; 3 SOLUTION RESPIRATORY (INHALATION) at 22:47

## 2022-11-20 RX ADMIN — LIDOCAINE HYDROCHLORIDE: 20 SOLUTION ORAL; TOPICAL at 21:57

## 2022-11-20 RX ADMIN — IPRATROPIUM BROMIDE AND ALBUTEROL SULFATE 1 AMPULE: .5; 3 SOLUTION RESPIRATORY (INHALATION) at 22:46

## 2022-11-20 RX ADMIN — SODIUM CHLORIDE: 9 INJECTION, SOLUTION INTRAVENOUS at 22:47

## 2022-11-20 RX ADMIN — IPRATROPIUM BROMIDE AND ALBUTEROL SULFATE 1 AMPULE: .5; 3 SOLUTION RESPIRATORY (INHALATION) at 22:43

## 2022-11-21 VITALS
WEIGHT: 165 LBS | SYSTOLIC BLOOD PRESSURE: 117 MMHG | OXYGEN SATURATION: 97 % | DIASTOLIC BLOOD PRESSURE: 72 MMHG | RESPIRATION RATE: 16 BRPM | HEART RATE: 98 BPM | BODY MASS INDEX: 25.84 KG/M2 | TEMPERATURE: 97.8 F

## 2022-11-21 LAB
EKG ATRIAL RATE: 83 BPM
EKG ATRIAL RATE: 90 BPM
EKG P AXIS: 72 DEGREES
EKG P AXIS: 72 DEGREES
EKG P-R INTERVAL: 134 MS
EKG P-R INTERVAL: 138 MS
EKG Q-T INTERVAL: 364 MS
EKG Q-T INTERVAL: 372 MS
EKG QRS DURATION: 78 MS
EKG QRS DURATION: 78 MS
EKG QTC CALCULATION (BAZETT): 427 MS
EKG QTC CALCULATION (BAZETT): 455 MS
EKG R AXIS: 31 DEGREES
EKG R AXIS: 38 DEGREES
EKG T AXIS: 34 DEGREES
EKG T AXIS: 37 DEGREES
EKG VENTRICULAR RATE: 83 BPM
EKG VENTRICULAR RATE: 90 BPM
TROPONIN, HIGH SENSITIVITY: <6 NG/L (ref 0–11)

## 2022-11-21 PROCEDURE — 93010 ELECTROCARDIOGRAM REPORT: CPT | Performed by: INTERNAL MEDICINE

## 2022-11-21 PROCEDURE — 84484 ASSAY OF TROPONIN QUANT: CPT

## 2022-11-21 PROCEDURE — 93005 ELECTROCARDIOGRAM TRACING: CPT | Performed by: EMERGENCY MEDICINE

## 2022-11-21 ASSESSMENT — HEART SCORE: ECG: 0

## 2022-11-21 NOTE — ED PROVIDER NOTES
HPI:  11/20/22, Time: 8:55 PM RASHEEDA Mcgowan is a 40 y.o. male presenting to the ED for chest discomfort, beginning hours ago. The complaint has been persistent, moderate in severity, and worsened by nothing. Patient reporting chest discomfort that started around 5 PM he states it is intermittent lasting several minutes at a time. Patient reports it does not radiate to his back or in his arms. Patient does report some aching in his upper body. Patient reporting no productive cough he does have a history of asthma he did smoke up until 4 months ago. Patient reporting no leg pain or swelling there is no vomiting or diarrhea. He reports no headache. He reports no syncopal event. ROS:   Pertinent positives and negatives are stated within HPI, all other systems reviewed and are negative.  --------------------------------------------- PAST HISTORY ---------------------------------------------  Past Medical History:  has no past medical history on file. Past Surgical History:  has no past surgical history on file. Social History:  reports that he has been smoking cigarettes. He has been smoking an average of .25 packs per day. He has never used smokeless tobacco. He reports that he does not currently use alcohol. He reports that he does not use drugs. Family History: family history is not on file. The patients home medications have been reviewed. Allergies: Patient has no known allergies.     ---------------------------------------------------PHYSICAL EXAM--------------------------------------    Constitutional/General: Alert and oriented x3, well appearing, non toxic in NAD  Head: Normocephalic and atraumatic  Eyes: PERRL, EOMI  Mouth: Oropharynx clear, handling secretions, no trismus  Neck: Supple, full ROM, non tender to palpation in the midline, no stridor, no crepitus, no meningeal signs  Pulmonary: Lungs wheezes bilateral not in respiratory distress  Cardiovascular:  Regular rate. Regular rhythm. No murmurs, gallops, or rubs. 2+ distal pulses  Chest: no chest wall tenderness  Abdomen: Soft. Non tender. Non distended. +BS. No rebound, guarding, or rigidity. No pulsatile masses appreciated. Musculoskeletal: Moves all extremities x 4. Warm and well perfused, no clubbing, cyanosis, or edema. Capillary refill <3 seconds  Skin: warm and dry. No rashes. Neurologic: GCS 15, CN 2-12 grossly intact, no focal deficits, symmetric strength 5/5 in the upper and lower extremities bilaterally  Psych: Normal Affect    -------------------------------------------------- RESULTS -------------------------------------------------  I have personally reviewed all laboratory and imaging results for this patient. Results are listed below.      LABS:  Results for orders placed or performed during the hospital encounter of 11/20/22   CBC with Auto Differential   Result Value Ref Range    WBC 8.9 4.5 - 11.5 E9/L    RBC 5.04 3.80 - 5.80 E12/L    Hemoglobin 14.3 12.5 - 16.5 g/dL    Hematocrit 42.0 37.0 - 54.0 %    MCV 83.3 80.0 - 99.9 fL    MCH 28.4 26.0 - 35.0 pg    MCHC 34.0 32.0 - 34.5 %    RDW 12.6 11.5 - 15.0 fL    Platelets 124 922 - 457 E9/L    MPV 10.6 7.0 - 12.0 fL    Neutrophils % 59.5 43.0 - 80.0 %    Immature Granulocytes % 0.2 0.0 - 5.0 %    Lymphocytes % 28.6 20.0 - 42.0 %    Monocytes % 6.6 2.0 - 12.0 %    Eosinophils % 4.5 0.0 - 6.0 %    Basophils % 0.6 0.0 - 2.0 %    Neutrophils Absolute 5.32 1.80 - 7.30 E9/L    Immature Granulocytes # 0.02 E9/L    Lymphocytes Absolute 2.56 1.50 - 4.00 E9/L    Monocytes Absolute 0.59 0.10 - 0.95 E9/L    Eosinophils Absolute 0.40 0.05 - 0.50 E9/L    Basophils Absolute 0.05 0.00 - 0.20 E9/L   Comprehensive Metabolic Panel   Result Value Ref Range    Sodium 134 132 - 146 mmol/L    Potassium 4.5 3.5 - 5.0 mmol/L    Chloride 99 98 - 107 mmol/L    CO2 23 22 - 29 mmol/L    Anion Gap 12 7 - 16 mmol/L    Glucose 377 (H) 74 - 99 mg/dL    BUN 18 6 - 20 mg/dL    Creatinine 1.1 0.7 - 1.2 mg/dL    Est, Glom Filt Rate >60 >=60 mL/min/1.73    Calcium 9.6 8.6 - 10.2 mg/dL    Total Protein 7.5 6.4 - 8.3 g/dL    Albumin 4.2 3.5 - 5.2 g/dL    Total Bilirubin 0.4 0.0 - 1.2 mg/dL    Alkaline Phosphatase 85 40 - 129 U/L    ALT 15 0 - 40 U/L    AST 18 0 - 39 U/L   Troponin   Result Value Ref Range    Troponin, High Sensitivity <6 0 - 11 ng/L   Troponin   Result Value Ref Range    Troponin, High Sensitivity <6 0 - 11 ng/L   EKG 12 Lead   Result Value Ref Range    Ventricular Rate 83 BPM    Atrial Rate 83 BPM    P-R Interval 134 ms    QRS Duration 78 ms    Q-T Interval 364 ms    QTc Calculation (Bazett) 427 ms    P Axis 72 degrees    R Axis 38 degrees    T Axis 37 degrees       RADIOLOGY:  Interpreted by Radiologist.  XR CHEST PORTABLE   Final Result   No pneumonia or pleural effusion. EKG: This EKG is signed and interpreted by me. Rate: 83  Rhythm: Sinus  Interpretation: no acute changes  Comparison: no previous EKG available    REPEAT EKG: This EKG is signed and interpreted by ED Physician. Rate: 90  Rhythm: Sinus. Interpretation: no acute changes. Comparison: stable as compared to patient's most recent EKG.    ------------------------- NURSING NOTES AND VITALS REVIEWED ---------------------------   The nursing notes within the ED encounter and vital signs as below have been reviewed by myself. /72   Pulse 98   Temp 97.8 °F (36.6 °C)   Resp 16   Wt 165 lb (74.8 kg)   SpO2 97%   BMI 25.84 kg/m²   Oxygen Saturation Interpretation: Normal    The patients available past medical records and past encounters were reviewed.         ------------------------------ ED COURSE/MEDICAL DECISION MAKING----------------------  Medications   0.9 % sodium chloride infusion ( IntraVENous New Bag 11/20/22 4590)   aluminum & magnesium hydroxide-simethicone (MAALOX) 30 mL, lidocaine viscous hcl (XYLOCAINE) 5 mL (GI COCKTAIL) ( Oral Given 11/20/22 4942)   ipratropium-albuterol (Mehrdad Salomon) nebulizer solution 1 ampule (1 ampule Inhalation Given 11/20/22 7445)             Medical Decision Making:   Patient presenting here because of chest discomfort started hours prior arrival.  Patient reports it started while he was in the mall he states is intermittent nonradiating. Patient reporting no abdominal pain no vomiting or diarrhea. Patient reporting no active coughing. Patient reporting no headache. Patient reporting no syncopal event. Patient did have wheezes on exam he was given GI cocktail as well as breathing treatment. Patient was reassessed here was improved repeat EKG shows no acute findings. Patient repeat troponin normal he is having no active pain. Patient heart score is a 1. Patient would rather go home I did speak to his wife who is also physician and they prefer to go home they will follow-up outpatient patient return at any time. Re-Evaluations:           Patient reeval several times here in the emergency room. Patient does report improvement. Patient reporting no active pain. Patient made aware of findings and plan he would prefer to go home and follow-up outpatient. I did speak to him as well as his wife and patient will be discharged home      Consultations:                 Critical Care: This patient's ED course included: a personal history and physicial eaxmination    This patient has been closely monitored during their ED course. Counseling: The emergency provider has spoken with the patient and discussed todays results, in addition to providing specific details for the plan of care and counseling regarding the diagnosis and prognosis. Questions are answered at this time and they are agreeable with the plan.       --------------------------------- IMPRESSION AND DISPOSITION ---------------------------------    IMPRESSION  1. Chest pain, unspecified type    2.  Bronchospasm        DISPOSITION  Disposition: Discharge home  Patient condition is stable        NOTE: This report was transcribed using voice recognition software.  Every effort was made to ensure accuracy; however, inadvertent computerized transcription errors may be present          Shobha Haider MD  11/21/22 4257

## 2022-11-21 NOTE — ED NOTES
Discharge instructions given to pt who verbalized understanding.  IV dc'd     Jammie Thomason RN  11/21/22 1029

## 2022-12-06 ENCOUNTER — OFFICE VISIT (OUTPATIENT)
Dept: FAMILY MEDICINE CLINIC | Age: 42
End: 2022-12-06
Payer: COMMERCIAL

## 2022-12-06 VITALS
HEIGHT: 67 IN | OXYGEN SATURATION: 97 % | HEART RATE: 81 BPM | WEIGHT: 167 LBS | RESPIRATION RATE: 18 BRPM | BODY MASS INDEX: 26.21 KG/M2 | SYSTOLIC BLOOD PRESSURE: 132 MMHG | DIASTOLIC BLOOD PRESSURE: 82 MMHG

## 2022-12-06 DIAGNOSIS — K21.9 GASTROESOPHAGEAL REFLUX DISEASE WITHOUT ESOPHAGITIS: ICD-10-CM

## 2022-12-06 DIAGNOSIS — E11.9 TYPE 2 DIABETES MELLITUS WITHOUT COMPLICATION, UNSPECIFIED WHETHER LONG TERM INSULIN USE (HCC): Primary | ICD-10-CM

## 2022-12-06 PROCEDURE — 99213 OFFICE O/P EST LOW 20 MIN: CPT | Performed by: FAMILY MEDICINE

## 2022-12-06 PROCEDURE — 90471 IMMUNIZATION ADMIN: CPT | Performed by: FAMILY MEDICINE

## 2022-12-06 PROCEDURE — 3051F HG A1C>EQUAL 7.0%<8.0%: CPT | Performed by: FAMILY MEDICINE

## 2022-12-06 PROCEDURE — 90674 CCIIV4 VAC NO PRSV 0.5 ML IM: CPT | Performed by: FAMILY MEDICINE

## 2022-12-06 ASSESSMENT — ENCOUNTER SYMPTOMS
SORE THROAT: 0
TROUBLE SWALLOWING: 0
COUGH: 0
CONSTIPATION: 0
ABDOMINAL PAIN: 0
NAUSEA: 0
DIARRHEA: 0
VOMITING: 0

## 2022-12-06 NOTE — PROGRESS NOTES
12/6/2022    Sudeep Arzola is a 40 y.o. malehere for:    HPI:    Is here for diabetes follow-up. Patient has recently started following endocrine for diabetes management. Patient is on metformin Ozempic and Lipitor at this time  Recent A1c is 7.0 improved from 11.2. Patient had episode of chest discomfort while walking in the mall and the pain was intermittent, nonradiating. Patient reports he had a meal about 4 to 5 hours ago before the chest pain started which he thinks did not sit well. Patient reports eating spicy food at home. He does has Pepcid at home however takes intermittently. Patient work-up in the ER was unremarkable for any cardiac cause his troponin was negative heart score was 1. patient was discharged home  No significant family history of early heart attack in the family  Patient wanted to know if he can get a stress test done. BP Readings from Last 3 Encounters:   12/06/22 132/82   11/21/22 117/72   11/01/22 128/85     Current Outpatient Medications   Medication Sig Dispense Refill    metFORMIN (GLUCOPHAGE-XR) 500 MG extended release tablet Take 1 tablet by mouth daily (with breakfast) 90 tablet 3    Continuous Blood Gluc Sensor (FREESTYLE ESTRELLA 2 SENSOR) MISC 1 each by Does not apply route every 14 days 2 each 11    OZEMPIC, 0.25 OR 0.5 MG/DOSE, 2 MG/1.5ML SOPN ADMINISTER 0.5 MG UNDER THE SKIN ONCE A WEEK 3 Adjustable Dose Pre-filled Pen Syringe 3    atorvastatin (LIPITOR) 20 MG tablet Take 1 tablet by mouth daily 90 tablet 3     No current facility-administered medications for this visit. No Known Allergies    Past Medical & Surgical History:  No past medical history on file. No past surgical history on file. Family History:  No family history on file.     Social History:  Social History     Tobacco Use    Smoking status: Some Days     Packs/day: 0.25     Types: Cigarettes    Smokeless tobacco: Never   Substance Use Topics    Alcohol use: Not Currently Immunization History   Administered Date(s) Administered    Influenza, FLUCELVAX, (age 10 mo+), MDCK, PF, 0.5mL 12/06/2022       Review of Systems   Constitutional:  Negative for chills and fever. HENT:  Negative for congestion, sore throat and trouble swallowing. Respiratory:  Negative for cough. Cardiovascular:  Negative for chest pain and leg swelling. Gastrointestinal:  Negative for abdominal pain, constipation, diarrhea, nausea and vomiting. Genitourinary:  Negative for difficulty urinating. Musculoskeletal:  Negative for arthralgias and myalgias. Skin:  Negative for rash and wound. Neurological:  Negative for dizziness and headaches. Psychiatric/Behavioral:  Negative for agitation. VS:  /82   Pulse 81   Resp 18   Ht 5' 7\" (1.702 m)   Wt 167 lb (75.8 kg)   SpO2 97%   BMI 26.16 kg/m²     Physical Exam  Constitutional:       Appearance: Normal appearance. HENT:      Head: Normocephalic. Nose: Nose normal. No rhinorrhea. Eyes:      General: No scleral icterus. Conjunctiva/sclera: Conjunctivae normal.   Cardiovascular:      Rate and Rhythm: Normal rate and regular rhythm. Pulses: Normal pulses. Heart sounds: Normal heart sounds. No murmur heard. Pulmonary:      Breath sounds: Normal breath sounds. No wheezing, rhonchi or rales. Abdominal:      General: Abdomen is flat. Bowel sounds are normal.   Musculoskeletal:      Right lower leg: No edema. Left lower leg: No edema. Skin:     General: Skin is warm. Findings: No rash. Neurological:      General: No focal deficit present. Mental Status: He is alert. Assessment/Plan:   1. Type 2 diabetes mellitus without complication  Continue following up with endocrine  Labs have been ordered. Patient is due for eye exam  Foot exam unremarkable  Flu vaccine today    2.  Gastroesophageal reflux disease without esophagitis  The chest discomfort was likely secondary to uncontrolled GERD symptoms  It was recommended avoiding trigger foods such as spicy food, oily, fried, sauces. She was also recommended continuing taking Pepcid. At this point during the stress test threshold is very low however if symptoms persist despite taking antiacid's and any chest pain with exertion or at rest we will further work him up for cardiac causes. Follow up:  6 months     Patient agrees with the above stated plan. Kenney received counseling on the following healthy behaviors: nutrition, exercise, and medication adherence.     Jessica Kwon MD  PGY-3 Family Medicine

## 2022-12-06 NOTE — PROGRESS NOTES
[facility] [address]  Precepting Note    Subjective: This was FU for DM but has seince seen Endocrine so they are now managing    Was in ED with midsternal CP while walking in the mall   Thinks trigger was a bad meal 4 hours or so prior. ED eval was unrevealing for cardiac problems. ? GERD  Has not had any recurrence of symptoms. Spicy foods dont bother him. No CP at rest or with exertion. No Fh of early CAD. LDL only 113 in Feb 2022. Hemoglobin A1C   Date Value Ref Range Status   11/01/2022 7.0 % Final     ROS otherwise negative     Past medical, surgical, family and social history were reviewed, non-contributory, and unchanged unless otherwise stated. Objective:    /82   Pulse 81   Resp 18   Ht 5' 7\" (1.702 m)   Wt 167 lb (75.8 kg)   SpO2 97%   BMI 26.16 kg/m²     Exam is as noted by resident with the following changes, additions or corrections:    General:  NAD; alert & oriented x 3   Heart:  RRR, no murmurs, gallops, or rubs. Lungs:  CTA bilaterally, no wheeze, rales or rhonchi  Abd: bowel sounds present, nontender, nondistended, no masses  Extrem:  No clubbing, cyanosis, or edema    Assessment/Plan:  CP, probably related to bad meal  No anginal problems or story. No need to treat for gerd right now as he hasnt had persistent symptoms. IF symptoms would recur, revisit gerd and consideration of CAD. But the low risk does not warrant the stress and expense of a stress test right now. Attending Physician Statement  I have reviewed the chart, including any radiology or labs. I have discussed the case, including pertinent history and exam findings with the resident. I agree with the assessment, plan and orders as documented by the resident. Please refer to the resident note for additional information.       Electronically signed by Sony Patrick MD on 12/6/2022 at 8:48 AM

## 2023-03-02 ENCOUNTER — TELEMEDICINE (OUTPATIENT)
Dept: ENDOCRINOLOGY | Age: 43
End: 2023-03-02
Payer: COMMERCIAL

## 2023-03-02 DIAGNOSIS — E55.9 VITAMIN D DEFICIENCY: ICD-10-CM

## 2023-03-02 DIAGNOSIS — E11.9 TYPE 2 DIABETES MELLITUS WITHOUT COMPLICATION, UNSPECIFIED WHETHER LONG TERM INSULIN USE (HCC): Primary | ICD-10-CM

## 2023-03-02 PROCEDURE — 99214 OFFICE O/P EST MOD 30 MIN: CPT | Performed by: INTERNAL MEDICINE

## 2023-03-02 RX ORDER — SEMAGLUTIDE 1.34 MG/ML
INJECTION, SOLUTION SUBCUTANEOUS
Qty: 3 ADJUSTABLE DOSE PRE-FILLED PEN SYRINGE | Refills: 3 | Status: SHIPPED | OUTPATIENT
Start: 2023-03-02

## 2023-03-02 RX ORDER — METFORMIN HYDROCHLORIDE 500 MG/1
500 TABLET, EXTENDED RELEASE ORAL
Qty: 90 TABLET | Refills: 3 | Status: SHIPPED | OUTPATIENT
Start: 2023-03-02

## 2023-03-02 NOTE — PROGRESS NOTES
Marcos Soto was read the following message We want to confirm that, for purposes of billing, this is a virtual visit with your provider for which we will submit a claim for reimbursement with your insurance company. You will be responsible for any copays, coinsurance amounts or other amounts not covered by your insurance company. If you do not accept this, unfortunately we will not be able to schedule or proceed with a virtual visit with the provider. Do you accept? Kenney responded Yes .

## 2023-03-02 NOTE — PROGRESS NOTES
[address] [place] [address] Department of Endocrinology Diabetes and Metabolism   [address] [facility] [address]   Phone: [phone]  Fax: [phone]    Date of Service: 3/2/2023  Primary Care Physician: Abraham Cody MD  Provider: Aurora Cooper MD    Reason for the visit:  DM type 2     History of Present Illness: The history is provided by the patient. No  was used. Accuracy of the patient data is excellent. Klaus Morrison is a very pleasant 40 y.o. male seen today for diabetes management     Klaus Morrison was diagnosed with diabetes at age 40  and currently on Metformin  mg daily, Ozempic 0.5 mg/wk   The patient has been checking blood sugar multiple times/day using freestyle Abbi and most readings at goal   Most recent A1c results summarized below  Lab Results   Component Value Date/Time    LABA1C 7.0 11/01/2022 12:21 PM    LABA1C 11.2 07/11/2022 02:17 PM    LABA1C 8.4 02/01/2022 09:24 AM     Patient has had no hypoglycemic episodes   I reviewed current medications and the patient has no issues with diabetes medications  Klaus Morrison is up to date with eye exam and denied any history of diabetic retinopathy   The patient  performs  own feet care  Microvascular complications:  No Retinopathy, Nephropathy or Neuropathy   Macrovascular complications: no CAD   The patient receives Flushot every year    PGM and [facility] DM     PAST MEDICAL HISTORY   No past medical history on file. PAST SURGICAL HISTORY   No past surgical history on file. SOCIAL HISTORY   Tobacco:   reports that he has been smoking cigarettes. He has been smoking an average of .25 packs per day. He has never used smokeless tobacco.  Alcohol:   reports that he does not currently use alcohol. Drugs:   reports no history of drug use. FAMILY HISTORY   No family history on file.     ALLERGIES AND DRUG REACTIONS   No Known Allergies    CURRENT MEDICATIONS   Current Outpatient Medications   Medication Sig Dispense Refill    metFORMIN (GLUCOPHAGE-XR) 500 MG extended release tablet Take 1 tablet by mouth daily (with breakfast) 90 tablet 3    OZEMPIC, 0.25 OR 0.5 MG/DOSE, 2 MG/1.5ML SOPN ADMINISTER 0.5 MG UNDER THE SKIN ONCE A WEEK 3 Adjustable Dose Pre-filled Pen Syringe 3    atorvastatin (LIPITOR) 20 MG tablet Take 1 tablet by mouth daily 90 tablet 3    Continuous Blood Gluc Sensor (FREESTYLE ESTRELLA 2 SENSOR) MISC 1 each by Does not apply route every 14 days (Patient not taking: Reported on 3/2/2023) 2 each 11     No current facility-administered medications for this visit. Review of Systems  Constitutional: No fever, no chills, no diaphoresis, no generalized weakness. HEENT: No blurred vision, No sore throat, no ear pain, no hair loss  Neck: denied any neck swelling, difficulty swallowing,   Cardio-pulmonary: No CP, SOB or palpitation, No orthopnea or PND. No cough or wheezing. GI: No N/V/D, no constipation, No abdominal pain, no melena or hematochezia   : Denied any dysuria, hematuria, flank pain, discharge, or incontinence. Skin: denied any rash, ulcer, Hirsute, or hyperpigmentation. MSK: denied any joint deformity, joint pain/swelling, muscle pain, or back pain. Neuro: no numbness, no tingling, no weakness, _    OBJECTIVE    There were no vitals taken for this visit. BP Readings from Last 4 Encounters:   12/06/22 132/82   11/21/22 117/72   11/01/22 128/85   09/19/22 126/87     Wt Readings from Last 6 Encounters:   12/06/22 167 lb (75.8 kg)   11/20/22 165 lb (74.8 kg)   11/01/22 165 lb (74.8 kg)   09/19/22 164 lb (74.4 kg)   09/02/22 164 lb (74.4 kg)   07/11/22 178 lb (80.7 kg)     Physical examination:  Due to this being a TeleHealth encounter, evaluation of the following organ systems is limited: Vitals/Constitutional/EENT/Resp/CV/GI//MS/Neuro/Skin/Heme-Lymph-Imm.     Modified physical exam through Telemedicine camera    General: Communicating well via camera   Neck: no obvious neck mass. No obvious neck deformity     CVS: no distress   Chest: no distress. Chest is moving with respiration    Extremities:  no visible tremor  Skin: No visible rashes as seen from camera   Musculoskeletal: no visible deformity  Neuro: Alert and oriented to person, place, and time. Psychiatric: Normal mood and affect.  Behavior is normal    Review of Laboratory Data:  I personally reviewed the following lab:  Lab Results   Component Value Date/Time    WBC 8.9 11/20/2022 09:10 PM    RBC 5.04 11/20/2022 09:10 PM    HGB 14.3 11/20/2022 09:10 PM    HCT 42.0 11/20/2022 09:10 PM    MCV 83.3 11/20/2022 09:10 PM    MCH 28.4 11/20/2022 09:10 PM    MCHC 34.0 11/20/2022 09:10 PM    RDW 12.6 11/20/2022 09:10 PM     11/20/2022 09:10 PM    MPV 10.6 11/20/2022 09:10 PM      Lab Results   Component Value Date/Time     11/20/2022 09:10 PM    K 4.5 11/20/2022 09:10 PM    CO2 23 11/20/2022 09:10 PM    BUN 18 11/20/2022 09:10 PM    CREATININE 1.1 11/20/2022 09:10 PM    CALCIUM 9.6 11/20/2022 09:10 PM    LABGLOM >60 11/20/2022 09:10 PM    GFRAA >60 02/02/2022 12:00 PM      No results found for: TSH, T4FREE, N7TKKMR, FT3, W0DULKJ, TSI, TPOABS, THGAB  Lab Results   Component Value Date/Time    LABA1C 7.0 11/01/2022 12:21 PM    GLUCOSE 377 11/20/2022 09:10 PM    MALBCR 17.4 02/02/2022 12:00 PM    LABMICR 36.4 02/02/2022 12:00 PM    LABCREA 209 02/02/2022 12:00 PM     Lab Results   Component Value Date/Time    LABA1C 7.0 11/01/2022 12:21 PM    LABA1C 11.2 07/11/2022 02:17 PM    LABA1C 8.4 02/01/2022 09:24 AM     Lab Results   Component Value Date/Time    TRIG 206 02/02/2022 12:00 PM    HDL 47 02/02/2022 12:00 PM    LDLCALC 113 02/02/2022 12:00 PM    CHOL 201 02/02/2022 12:00 PM     No results found for: VITD25    ASSESSMENT & RECOMMENDATIONS   Mariah Palacios, a 40 y.o.-old male seen in for the following issues     Diabetes Mellitus Type 2     A1c 7%   Continue Metformin er 500 mg daily, Ozempic 0.5 mg/wk Continue using freestyle Abbi system   Discussed with patient A1c and blood sugar goals   Patient will need routine diabetes maintenance and prevention  Diabetes labs before next visit     Dietary noncompliance  Discussed with patient the importance of eating consistent carbohydrate meals, avoiding high glycemic index food. Also, discussed with patient the risk and negative consequences of dietary noncompliance on blood glucose control, blood pressure and weight    I personally reviewed external notes from PCP and other patient's care team providers, and personally interpreted labs associated with the above diagnosis. I also ordered labs to further assess and manage the above addressed medical conditions. Return in about 4 months (around 7/2/2023) for DM type 2, VitD deficiency. The above issues were reviewed with the patient who understood and agreed with the plan. Thank you for allowing us to participate in the care of this patient. Please do not hesitate to contact us with any additional questions. Diagnosis Orders   1. Type 2 diabetes mellitus without complication, unspecified whether long term insulin use (HCC)  metFORMIN (GLUCOPHAGE-XR) 500 MG extended release tablet    OZEMPIC, 0.25 OR 0.5 MG/DOSE, 2 MG/1.5ML SOPN    Hemoglobin A1C      2. Vitamin D deficiency              Chelly Perales MD  Endocrinologist, [facility]   [address] [facility] [address]   Phone: [phone]  Fax: [phone]  --------------------------------------------  An electronic signature was used to authenticate this note.  Terry Billy MD on 3/2/2023 at 9:23 AM

## 2023-07-05 ENCOUNTER — OFFICE VISIT (OUTPATIENT)
Dept: ENDOCRINOLOGY | Age: 43
End: 2023-07-05

## 2023-07-05 VITALS
SYSTOLIC BLOOD PRESSURE: 148 MMHG | HEIGHT: 67 IN | BODY MASS INDEX: 25.9 KG/M2 | DIASTOLIC BLOOD PRESSURE: 92 MMHG | OXYGEN SATURATION: 97 % | WEIGHT: 165 LBS | HEART RATE: 89 BPM

## 2023-07-05 DIAGNOSIS — Z91.119 DIETARY NONCOMPLIANCE: ICD-10-CM

## 2023-07-05 DIAGNOSIS — E11.9 TYPE 2 DIABETES MELLITUS WITHOUT COMPLICATION, UNSPECIFIED WHETHER LONG TERM INSULIN USE (HCC): Primary | ICD-10-CM

## 2023-07-05 LAB — HBA1C MFR BLD: 9.5 %

## 2023-07-05 RX ORDER — ATORVASTATIN CALCIUM 20 MG/1
20 TABLET, FILM COATED ORAL DAILY
Qty: 90 TABLET | Refills: 3 | Status: SHIPPED | OUTPATIENT
Start: 2023-07-05

## 2023-07-05 RX ORDER — METFORMIN HYDROCHLORIDE 500 MG/1
1000 TABLET, EXTENDED RELEASE ORAL
Qty: 180 TABLET | Refills: 3 | Status: SHIPPED | OUTPATIENT
Start: 2023-07-05

## 2023-07-05 RX ORDER — SEMAGLUTIDE 1.34 MG/ML
INJECTION, SOLUTION SUBCUTANEOUS
Qty: 3 ADJUSTABLE DOSE PRE-FILLED PEN SYRINGE | Refills: 3 | Status: SHIPPED | OUTPATIENT
Start: 2023-07-05

## 2023-07-05 NOTE — PROGRESS NOTES
rash. No open wounds, no ulcers   Neuro: CN intact, sensation present bilateral , muscle power normal  Psych: normal mood, and affect    Review of Laboratory Data:  I personally reviewed the following lab:  Lab Results   Component Value Date/Time    WBC 8.9 11/20/2022 09:10 PM    RBC 5.04 11/20/2022 09:10 PM    HGB 14.3 11/20/2022 09:10 PM    HCT 42.0 11/20/2022 09:10 PM    MCV 83.3 11/20/2022 09:10 PM    MCH 28.4 11/20/2022 09:10 PM    MCHC 34.0 11/20/2022 09:10 PM    RDW 12.6 11/20/2022 09:10 PM     11/20/2022 09:10 PM    MPV 10.6 11/20/2022 09:10 PM      Lab Results   Component Value Date/Time     11/20/2022 09:10 PM    K 4.5 11/20/2022 09:10 PM    CO2 23 11/20/2022 09:10 PM    BUN 18 11/20/2022 09:10 PM    CREATININE 1.1 11/20/2022 09:10 PM    CALCIUM 9.6 11/20/2022 09:10 PM    LABGLOM >60 11/20/2022 09:10 PM    GFRAA >60 02/02/2022 12:00 PM      No results found for: TSH, T4FREE, J9DUQOV, FT3, I3ZYFZL, TSI, TPOABS, THGAB  Lab Results   Component Value Date/Time    LABA1C 9.5 07/05/2023 11:53 AM    GLUCOSE 377 11/20/2022 09:10 PM    MALBCR 17.4 02/02/2022 12:00 PM    LABMICR 36.4 02/02/2022 12:00 PM    LABCREA 209 02/02/2022 12:00 PM     Lab Results   Component Value Date/Time    LABA1C 9.5 07/05/2023 11:53 AM    LABA1C 7.0 11/01/2022 12:21 PM    LABA1C 11.2 07/11/2022 02:17 PM     Lab Results   Component Value Date/Time    TRIG 206 02/02/2022 12:00 PM    HDL 47 02/02/2022 12:00 PM    LDLCALC 113 02/02/2022 12:00 PM    CHOL 201 02/02/2022 12:00 PM     No results found for: VITD25    ASSESSMENT & RECOMMENDATIONS   Norma Mosher, a 40 y.o.-old male seen in for the following issues     Diabetes Mellitus Type 2     Worsening control due to poor compliance with diet.   A1c increased from 7% to 9% in few months  We will change metformin er 1000 mg daily, Ozempic 0.5 mg/wk   Continue using freestyle Abbi system   Discussed with patient A1c and blood sugar goals   Patient will need routine diabetes

## 2023-07-09 PROBLEM — Z91.119 DIETARY NONCOMPLIANCE: Status: ACTIVE | Noted: 2023-07-09

## 2023-10-04 ENCOUNTER — TELEMEDICINE (OUTPATIENT)
Dept: ENDOCRINOLOGY | Age: 43
End: 2023-10-04
Payer: COMMERCIAL

## 2023-10-04 DIAGNOSIS — E11.9 TYPE 2 DIABETES MELLITUS WITHOUT COMPLICATION, UNSPECIFIED WHETHER LONG TERM INSULIN USE (HCC): Primary | ICD-10-CM

## 2023-10-04 DIAGNOSIS — Z91.119 DIETARY NONCOMPLIANCE: ICD-10-CM

## 2023-10-04 DIAGNOSIS — E78.5 HYPERLIPIDEMIA, UNSPECIFIED HYPERLIPIDEMIA TYPE: ICD-10-CM

## 2023-10-04 PROCEDURE — 3046F HEMOGLOBIN A1C LEVEL >9.0%: CPT | Performed by: INTERNAL MEDICINE

## 2023-10-04 PROCEDURE — 99214 OFFICE O/P EST MOD 30 MIN: CPT | Performed by: INTERNAL MEDICINE

## 2023-10-04 NOTE — PROGRESS NOTES
[facility] Department of Endocrinology Diabetes and Metabolism   [address] [facility] [address]   Phone: [phone]  Fax: [phone]    Date of Service: 10/4/2023  Primary Care Physician: Christina Pickard MD  Provider: Ramesh Hastings MD    Reason for the visit:  DM type 2     History of Present Illness: The history is provided by the patient. No  was used. Accuracy of the patient data is excellent. Korey Louise is a very pleasant 40 y.o. male seen today for diabetes management     Korey Louise was diagnosed with diabetes at age 40  and currently on Metformin  mg 2 tab daily   The patient was not able to tolerate Ozempic. He experienced nausea and vomiting with it. He did so well in the past with A1c dropped to 7% but now up again to 9.5%  Lab Results   Component Value Date/Time    LABA1C 9.5 07/05/2023 11:53 AM    LABA1C 7.0 11/01/2022 12:21 PM    LABA1C 11.2 07/11/2022 02:17 PM     Patient has had no hypoglycemic episodes   I reviewed current medications and the patient has no issues with diabetes medications  Korey Louise is up to date with eye exam and denied any history of diabetic retinopathy   The patient  performs  own feet care  Microvascular complications:  No Retinopathy, Nephropathy or Neuropathy   Macrovascular complications: no CAD   The patient receives Flushot every year    PGM and MGM DM     PAST MEDICAL HISTORY   No past medical history on file. PAST SURGICAL HISTORY   No past surgical history on file. SOCIAL HISTORY   Tobacco:   reports that he has been smoking cigarettes. He has been smoking an average of .25 packs per day. He has never used smokeless tobacco.  Alcohol:   reports that he does not currently use alcohol. Drugs:   reports no history of drug use. FAMILY HISTORY   No family history on file.     ALLERGIES AND DRUG REACTIONS   No Known Allergies    CURRENT MEDICATIONS

## 2023-10-06 ENCOUNTER — HOSPITAL ENCOUNTER (OUTPATIENT)
Age: 43
Discharge: HOME | End: 2023-10-06
Payer: COMMERCIAL

## 2023-10-06 DIAGNOSIS — E11.9 TYPE 2 DIABETES MELLITUS WITHOUT COMPLICATION, UNSPECIFIED WHETHER LONG TERM INSULIN USE (HCC): ICD-10-CM

## 2023-10-06 LAB
ALBUMIN SERPL-MCNC: 4.5 G/DL (ref 3.5–5.2)
ALP SERPL-CCNC: 83 U/L (ref 40–129)
ALT SERPL-CCNC: 18 U/L (ref 0–40)
ANION GAP SERPL CALCULATED.3IONS-SCNC: 9 MMOL/L (ref 7–16)
AST SERPL-CCNC: 22 U/L (ref 0–39)
BILIRUB SERPL-MCNC: 0.5 MG/DL (ref 0–1.2)
BUN SERPL-MCNC: 5 MG/DL (ref 6–20)
CALCIUM SERPL-MCNC: 9.8 MG/DL (ref 8.6–10.2)
CHLORIDE SERPL-SCNC: 105 MMOL/L (ref 98–107)
CHOLEST SERPL-MCNC: 87 MG/DL
CO2 SERPL-SCNC: 28 MMOL/L (ref 22–29)
CREAT SERPL-MCNC: 0.9 MG/DL (ref 0.7–1.2)
CREAT UR-MCNC: 153.5 MG/DL (ref 40–278)
GFR SERPL CREATININE-BSD FRML MDRD: >60 ML/MIN/1.73M2
GLUCOSE SERPL-MCNC: 154 MG/DL (ref 74–99)
HBA1C MFR BLD: 8.9 % (ref 4–5.6)
HDLC SERPL-MCNC: 45 MG/DL
LDLC SERPL CALC-MCNC: 23 MG/DL
MICROALBUMIN UR-MCNC: 31 MG/L (ref 0–19)
MICROALBUMIN/CREAT UR-RTO: 20 MCG/MG CREAT (ref 0–30)
POTASSIUM SERPL-SCNC: 4.2 MMOL/L (ref 3.5–5)
PROT SERPL-MCNC: 7.9 G/DL (ref 6.4–8.3)
SODIUM SERPL-SCNC: 142 MMOL/L (ref 132–146)
TRIGL SERPL-MCNC: 93 MG/DL
VLDLC SERPL CALC-MCNC: 19 MG/DL

## 2023-10-06 PROCEDURE — 82043 UR ALBUMIN QUANTITATIVE: CPT

## 2023-10-06 PROCEDURE — 82570 ASSAY OF URINE CREATININE: CPT

## 2023-10-06 PROCEDURE — 83036 HEMOGLOBIN GLYCOSYLATED A1C: CPT

## 2023-10-06 PROCEDURE — 80053 COMPREHEN METABOLIC PANEL: CPT

## 2023-10-06 PROCEDURE — 80061 LIPID PANEL: CPT

## 2023-10-06 PROCEDURE — 36415 COLL VENOUS BLD VENIPUNCTURE: CPT

## 2023-10-09 ENCOUNTER — TELEPHONE (OUTPATIENT)
Dept: ENDOCRINOLOGY | Age: 43
End: 2023-10-09

## 2023-10-09 NOTE — TELEPHONE ENCOUNTER
Notify patient  All results are very good except the A1c which is 8.9%. Please take diabetes medication as we recommended and send us a sugar log in few weeks.   I recommend checking blood sugar at least once a day and send us a sugar log in a week or so

## 2023-11-21 DIAGNOSIS — E11.9 TYPE 2 DIABETES MELLITUS WITHOUT COMPLICATION, UNSPECIFIED WHETHER LONG TERM INSULIN USE (HCC): Primary | ICD-10-CM

## 2023-12-11 ENCOUNTER — OFFICE VISIT (OUTPATIENT)
Dept: ENDOCRINOLOGY | Age: 43
End: 2023-12-11
Payer: COMMERCIAL

## 2023-12-11 VITALS
BODY MASS INDEX: 24.96 KG/M2 | HEART RATE: 75 BPM | RESPIRATION RATE: 18 BRPM | WEIGHT: 159 LBS | OXYGEN SATURATION: 98 % | SYSTOLIC BLOOD PRESSURE: 111 MMHG | DIASTOLIC BLOOD PRESSURE: 75 MMHG | HEIGHT: 67 IN

## 2023-12-11 DIAGNOSIS — E11.9 TYPE 2 DIABETES MELLITUS WITHOUT COMPLICATION, UNSPECIFIED WHETHER LONG TERM INSULIN USE (HCC): ICD-10-CM

## 2023-12-11 DIAGNOSIS — Z91.119 DIETARY NONCOMPLIANCE: ICD-10-CM

## 2023-12-11 DIAGNOSIS — E78.5 HYPERLIPIDEMIA, UNSPECIFIED HYPERLIPIDEMIA TYPE: Primary | ICD-10-CM

## 2023-12-11 LAB
AVERAGE GLUCOSE: NORMAL
HBA1C MFR BLD: 8 %

## 2023-12-11 PROCEDURE — 99214 OFFICE O/P EST MOD 30 MIN: CPT | Performed by: INTERNAL MEDICINE

## 2023-12-11 PROCEDURE — 3052F HG A1C>EQUAL 8.0%<EQUAL 9.0%: CPT | Performed by: INTERNAL MEDICINE

## 2023-12-11 RX ORDER — ATORVASTATIN CALCIUM 20 MG/1
20 TABLET, FILM COATED ORAL DAILY
Qty: 90 TABLET | Refills: 3 | Status: SHIPPED | OUTPATIENT
Start: 2023-12-11

## 2023-12-11 RX ORDER — METFORMIN HYDROCHLORIDE 500 MG/1
1000 TABLET, EXTENDED RELEASE ORAL
Qty: 180 TABLET | Refills: 3 | Status: SHIPPED | OUTPATIENT
Start: 2023-12-11

## 2023-12-11 RX ORDER — BLOOD-GLUCOSE SENSOR
EACH MISCELLANEOUS
Qty: 6 EACH | Refills: 3 | Status: SHIPPED | OUTPATIENT
Start: 2023-12-11

## 2023-12-11 NOTE — PROGRESS NOTES
[facility] Department of Endocrinology Diabetes and Metabolism   [facility] [address] [address]   Phone: [phone]  Fax: [phone]    Date of Service: 12/11/2023  Primary Care Physician: Mikayla Randall MD  Provider: Claudeen Provencal, MD    Reason for the visit:  DM type 2     History of Present Illness: The history is provided by the patient. No  was used. Accuracy of the patient data is excellent. Alice Louis is a very pleasant 44 y.o. male seen today for diabetes management     Alice Louis was diagnosed with diabetes at age 40  and currently on Metformin  mg 2 tab daily , Jardiance 10 mg daily   The patient was not able to tolerate Ozempic. He experienced nausea and vomiting with it. A1c today 8% down from 8.9% down from 9.5%  Lab Results   Component Value Date/Time    LABA1C 8.0 12/11/2023 12:00 AM    LABA1C 8.9 10/06/2023 09:58 AM    LABA1C 9.5 07/05/2023 11:53 AM     Patient has had no hypoglycemic episodes   I reviewed current medications and the patient has no issues with diabetes medications  Alice Louis is up to date with eye exam and denied any history of diabetic retinopathy   The patient  performs  own feet care  Microvascular complications:  No Retinopathy, Nephropathy or Neuropathy   Macrovascular complications: no CAD   The patient receives Flushot every year    PGM and MGM DM     PAST MEDICAL HISTORY   No past medical history on file. PAST SURGICAL HISTORY   No past surgical history on file. SOCIAL HISTORY   Tobacco:   reports that he has been smoking cigarettes. He has been smoking an average of .25 packs per day. He has never used smokeless tobacco.  Alcohol:   reports that he does not currently use alcohol. Drugs:   reports no history of drug use. FAMILY HISTORY   No family history on file.     ALLERGIES AND DRUG REACTIONS   No Known Allergies    CURRENT MEDICATIONS   Current

## 2024-02-14 ENCOUNTER — OFFICE VISIT (OUTPATIENT)
Dept: ORTHOPEDIC SURGERY | Age: 44
End: 2024-02-14
Payer: COMMERCIAL

## 2024-02-14 DIAGNOSIS — M79.642 LEFT HAND PAIN: Primary | ICD-10-CM

## 2024-02-14 PROCEDURE — 99203 OFFICE O/P NEW LOW 30 MIN: CPT | Performed by: STUDENT IN AN ORGANIZED HEALTH CARE EDUCATION/TRAINING PROGRAM

## 2024-02-14 NOTE — PROGRESS NOTES
tablet, Rfl: 3    Continuous Blood Gluc Sensor (FREESTYLE ESTRELLA 3 SENSOR) MISC, Change sensor every 14 days, Disp: 6 each, Rfl: 3    ALLERGIES  No Known Allergies    Controlled Substances Monitoring:        REVIEW OF SYSTEMS:     Constitutional:  Negative for weight loss, fevers, chills, fatigue  Cardiovascular: Negative for chest pain, palpitations  Pulmonary: Negative for shortness of breath, labored breathing, cough  GI: negative for abdominal pain, nausea, vomitting   MSK: per HPI  Skin: negative for rash, open wounds    All other systems reviewed and are negative           PHYSICAL EXAM     There were no vitals filed for this visit.    Height:    Weight: [unfilled]  BMI:  There is no height or weight on file to calculate BMI.    General: The patient is alert and oriented x 3, appears to be stated age and in no distress.   HEENT: head is normocephalic, atraumatic.  EOMI.   Neck: supple, trachea midline, no thyromegaly   Cardiovascular: peripheral pulses palpable.  Normal Capillary refill   Respiratory: breathing unlabored, chest expansion symmetric   Skin: no rash, no open wounds, no erythema  Psych: normal affect; mood stable  Neurologic: gait normal, sensation grossly intact in extremities  MSK:      Hand/Wrist:  Tenderness over the scaphoid tubercle, no tenderness in the anatomic snuff box. Moderate discomfort with CMC grind test. Negative tinels and durkans, SILT to the radial, median and ulnar nerve distributions to the hand. AIN,PIN,Ulnar  nerve motor function intact. Good capillary refill to all digits. Flexor and extensor tendons to the thumb intact. No instability of the thumb.       IMAGING:     XRAY:  3 views of the left hand reviewed demonstrates no fractures or dislocations, specifically no acute fracture, healing fracture or nonunion of the scaphoid. 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  These images were independently interpreted by myself and discussed

## 2024-04-16 ENCOUNTER — OFFICE VISIT (OUTPATIENT)
Dept: ENDOCRINOLOGY | Age: 44
End: 2024-04-16
Payer: COMMERCIAL

## 2024-04-16 VITALS
RESPIRATION RATE: 16 BRPM | DIASTOLIC BLOOD PRESSURE: 71 MMHG | HEIGHT: 67 IN | BODY MASS INDEX: 25.11 KG/M2 | OXYGEN SATURATION: 99 % | HEART RATE: 84 BPM | WEIGHT: 160 LBS | SYSTOLIC BLOOD PRESSURE: 103 MMHG

## 2024-04-16 DIAGNOSIS — E11.9 TYPE 2 DIABETES MELLITUS WITHOUT COMPLICATION, UNSPECIFIED WHETHER LONG TERM INSULIN USE (HCC): Primary | ICD-10-CM

## 2024-04-16 DIAGNOSIS — Z91.119 DIETARY NONCOMPLIANCE: ICD-10-CM

## 2024-04-16 DIAGNOSIS — E78.5 HYPERLIPIDEMIA, UNSPECIFIED HYPERLIPIDEMIA TYPE: ICD-10-CM

## 2024-04-16 LAB — HBA1C MFR BLD: 8.5 %

## 2024-04-16 PROCEDURE — 83036 HEMOGLOBIN GLYCOSYLATED A1C: CPT | Performed by: INTERNAL MEDICINE

## 2024-04-16 PROCEDURE — 99214 OFFICE O/P EST MOD 30 MIN: CPT | Performed by: INTERNAL MEDICINE

## 2024-04-16 PROCEDURE — 3052F HG A1C>EQUAL 8.0%<EQUAL 9.0%: CPT | Performed by: INTERNAL MEDICINE

## 2024-04-16 RX ORDER — LANCETS
EACH MISCELLANEOUS
Qty: 100 EACH | Refills: 3 | Status: SHIPPED | OUTPATIENT
Start: 2024-04-16

## 2024-04-16 RX ORDER — METFORMIN HYDROCHLORIDE 500 MG/1
1000 TABLET, EXTENDED RELEASE ORAL
Qty: 360 TABLET | Refills: 3 | Status: SHIPPED | OUTPATIENT
Start: 2024-04-16

## 2024-04-16 RX ORDER — GLUCOSAMINE HCL/CHONDROITIN SU 500-400 MG
CAPSULE ORAL
Qty: 250 STRIP | Refills: 5 | Status: SHIPPED | OUTPATIENT
Start: 2024-04-16

## 2024-04-16 NOTE — PROGRESS NOTES
[facility] Department of Endocrinology Diabetes and Metabolism   [address] [place] [address]   Phone: [phone]  Fax: [phone]    Date of Service: 4/16/2024  Primary Care Physician: Zabrina Cervantes MD  Provider: Gustavo Bills MD    Reason for the visit:  DM type 2     History of Present Illness:  The history is provided by the patient. No  was used. Accuracy of the patient data is excellent.  Kenney Lopez is a very pleasant 43 y.o. male seen today for diabetes management     Kenney Lopez was diagnosed with diabetes at age 37  and currently on Metformin  mg 2 tab daily , Jardiance 25 mg daily   The patient was not able to tolerate Ozempic.  He experienced nausea and vomiting with it.  Lab Results   Component Value Date/Time    LABA1C 8.5 04/16/2024 09:43 AM    LABA1C 8.0 12/11/2023 12:00 AM    LABA1C 8.9 10/06/2023 09:58 AM     Patient has had no hypoglycemic episodes   I reviewed current medications and the patient has no issues with diabetes medications  Kenney Lopez is up to date with eye exam and denied any history of diabetic retinopathy   The patient  performs  own feet care  Microvascular complications:  No Retinopathy, Nephropathy or Neuropathy   Macrovascular complications: no CAD   The patient receives Flushot every year    PGM and MGM DM     PAST MEDICAL HISTORY   No past medical history on file.    PAST SURGICAL HISTORY   No past surgical history on file.    SOCIAL HISTORY   Tobacco:   reports that he has quit smoking. His smoking use included cigarettes. He has never used smokeless tobacco.  Alcohol:   reports that he does not currently use alcohol.  Drugs:   reports no history of drug use.    FAMILY HISTORY   No family history on file.    ALLERGIES AND DRUG REACTIONS   No Known Allergies    CURRENT MEDICATIONS   Current Outpatient Medications   Medication Sig Dispense Refill    metFORMIN

## 2024-04-24 DIAGNOSIS — E78.5 HYPERLIPIDEMIA, UNSPECIFIED HYPERLIPIDEMIA TYPE: ICD-10-CM

## 2024-04-24 DIAGNOSIS — E11.9 TYPE 2 DIABETES MELLITUS WITHOUT COMPLICATION, UNSPECIFIED WHETHER LONG TERM INSULIN USE (HCC): Primary | ICD-10-CM

## 2024-05-10 ENCOUNTER — HOSPITAL ENCOUNTER (OUTPATIENT)
Dept: DIABETES SERVICES | Age: 44
Setting detail: THERAPIES SERIES
Discharge: HOME | End: 2024-05-10
Attending: INTERNAL MEDICINE
Payer: COMMERCIAL

## 2024-05-10 PROCEDURE — G0108 DIAB MANAGE TRN  PER INDIV: HCPCS

## 2024-05-10 SDOH — ECONOMIC STABILITY: FOOD INSECURITY: ADDITIONAL INFORMATION: NO

## 2024-05-10 ASSESSMENT — PROBLEM AREAS IN DIABETES QUESTIONNAIRE (PAID)
FEELING THAT DIABETES IS TAKING UP TOO MUCH OF YOUR MENTAL AND PHYSICAL ENERGY EVERY DAY: SOMEWHAT SERIOUS PROBLEM
COPING WITH COMPLICATIONS OF DIABETES: MODERATE PROBLEM
FEELING DEPRESSED WHEN YOU THINK ABOUT LIVING WITH DIABETES: MODERATE PROBLEM
WORRYING ABOUT THE FUTURE AND THE POSSIBILITY OF SERIOUS COMPLICATIONS: SERIOUS PROBLEM
FEELING SCARED WHEN YOU THINK ABOUT LIVING WITH DIABETES: MODERATE PROBLEM
PAID-5 TOTAL SCORE: 13

## 2024-05-10 NOTE — PROGRESS NOTES
5/10/24 CS  Patient most interested in meal planning; carbohydrate foods and measuring information.   Instructed patient on 2200 calorie carbohydrate controlled meal plan:  8 oz lean protein, 9 servings fat, 16 carbohydrate choices/day: 5 choices breakfast, lunch and dinner, 1 choice pm snack. Discussed timing of meals 5 hours apart. Discussed alcohol consumption/ ADA  Recommendations. Patient plans on starting to measure carbohydrate foods, specifically rice- encouraged brown rice over white- willing to try.    Patient will to plan meals using meal planning food lists.      Developing strategies for problem solving to promote health/change behavior.  -Identify 7 self-care behaviors; Personal health risk factors; Benefits, challenges & strategies for behavioral change and set an individualized goal selection. 1       [x]  4  5/10/24 CS  [x]Nutrition- I will measure my carbohydrate foods   []Monitoring  []Exercise  []Medication  []Other     Identified Barriers to learning/adherence to self management plan:    None  []  other    Instruction Method:  Lecture/Discussion, Handouts, and Return demonstration     Supporting Education Materials/Equipment Provided: Educational Binder, Meal Plan, and Nutritional Packet   []Serbian materials       [] services     []How to Plan Healthy Meals Using the Mediterranean Diet Book  []Other:    Encounter Type Date Attended Start Time End Time Comments  DSMES/MNT units charged Next appointment:   Assessment           Session 1          Session 2 5/10/24 CS 0840 [id number]  2    DSMES Meal plan RDN          Session # 3 RN          Session #3 RDN          DSMES individual appnt RN/RDN         Meter Instrx         Insulin Instrx          GDM individual         GDM group         MNT individual initial         MNT individual f/u         Yearly DSMES f/u           Additional Comments: [] Pt seen individually due to no group class available

## 2024-07-15 DIAGNOSIS — E11.9 TYPE 2 DIABETES MELLITUS WITHOUT COMPLICATION, UNSPECIFIED WHETHER LONG TERM INSULIN USE (HCC): ICD-10-CM

## 2024-07-16 RX ORDER — ATORVASTATIN CALCIUM 20 MG/1
20 TABLET, FILM COATED ORAL DAILY
Qty: 90 TABLET | Refills: 0 | Status: SHIPPED | OUTPATIENT
Start: 2024-07-16

## 2024-07-30 LAB — DIABETIC RETINOPATHY: NEGATIVE

## 2024-08-26 ENCOUNTER — FOLLOWUP TELEPHONE ENCOUNTER (OUTPATIENT)
Dept: DIABETES SERVICES | Age: 44
End: 2024-08-26

## 2024-08-26 NOTE — PROGRESS NOTES
Diabetes Self-Management Education Record    Participant Name: Kenney Lopez  Referring Provider: Zabrina Cervantes MD  Assessment/Evaluation Ratings:  1=Needs Instruction   4=Demonstrates Understanding/Competency  2=Needs Review   NC=Not Covered    3=Comprehends Key Points  N/A=Not Applicable  Topics/Learning Objectives Pre-session Assess Date:  Instructor initials/date  5/10/24 CS Instruction provided  5/10/24 CS     Yearly follow-up/  reinforcement date. Post- session Eval Comments   Diabetes disease process & Treatment process:   -Define type of diabetes in simple terms.  - Describe the ABCs of  diabetes management  -Identify own type of diabetes  -Identify lifestyle changes/treatment options  -other:  1 [x] All     []  []  []  []  []  4 5/10/24 CS  Type 2- diagnosed about 5-6 years ago   Family history      Developing strategies for Healthy coping/psychosocial issues:    -Describe feelings about living with diabetes  -Identify coping strategies and sources of stress  -Identify support needed & support network available  -Complete PAID-5 Diabetes questionnaire 1 [x] All     []  []  []    []  4 Date:5/10/24 CS  PAID-5 Score: 13   Confidence Score: 5           Prevention, detection & treatment of Chronic complications:    -Identify the prevention, detection and treatment for complications including immunizations, preventive eye, foot, dental and renal exams as indicated per the participant's duration of diabetes and health status.  -Define the natural course of diabetes and the relationship of blood glucose levels to long term complications of diabetes.  1 [x] All     []            []  4    Prevention, detection & treatment of acute complications:    -State the causes,signs & symptoms of hyper & hypoglycemia, and prevention & treatment strategies.   -Describe sick day guidelines  DKA /indications for ketone testing &  when to call physician  1 [x] All     []      []    4 5/10/24 CS  Symptoms of  blurry vision              -Identify severe weather/situation crisis  & diabetes supplies management  []      Using medications safely:   -State effects of diabetes medicines on blood glucose levels;  -List diabetes medication taken, action & side effects 1 [x] All     []  []  4 5/10/24 CS  Metformin BID  Jardiance daily   Takes as ordered    Insulin/Injectables/glucagon  -Name appropriate injection sites; proper storage; supplies needed;     []       Demonstrate proper technique  []      Monitoring blood glucose, interpreting and using results:   -Identify the purpose of testing   -Identify recommended & personal blood glucose targets & HgbA1C target levels  -State the Importance of logging blood glucose levels for pattern recognition;   -State benefits of reading/using pt generated health data  -Verbalize safe lancet disposal/CGM 1 [x] All     []  []    []  []  []  4  5/10/24 CS  A1C 8.5% (4/16/24)  Abbi 3 and glucose meter      -Demonstrate proper testing technique  []      Incorporating physical activity into lifestyle:   -State effect of exercise on blood glucose levels;   -State benefits of regular exercise;   -Define safety considerations/food choices if needed.  -Describe contraindications/maintenance of activity. 1 [x] All     []  []    []  []  4 5/10/24 CS  Walking at least 5 days a week    Incorporating nutritional management into lifestyle:   -Describe effect of type, amount & timing of food on blood glucose  -Describe methods for preparing and planning   healthy meals  -Correctly read food labels for nutritional values  -Name 3 foods high in Carbohydrate  -Plan a sample 4 carbohydrate-controlled meal using Diabetes Plate Method  -Verbalized ability to measure and count carbohydrate gram servings using food labels and carbohydrate food list.    -Plan a carbohydrate-controlled meal based on individual needs/preferences from a Registered Dietitian.   1 [x] All       []    []    []  []      []        []  4

## 2024-10-28 ENCOUNTER — OFFICE VISIT (OUTPATIENT)
Dept: ENDOCRINOLOGY | Age: 44
End: 2024-10-28
Payer: COMMERCIAL

## 2024-10-28 VITALS
SYSTOLIC BLOOD PRESSURE: 131 MMHG | DIASTOLIC BLOOD PRESSURE: 76 MMHG | HEIGHT: 67 IN | RESPIRATION RATE: 18 BRPM | WEIGHT: 161 LBS | OXYGEN SATURATION: 99 % | BODY MASS INDEX: 25.27 KG/M2 | HEART RATE: 95 BPM

## 2024-10-28 DIAGNOSIS — E11.9 TYPE 2 DIABETES MELLITUS WITHOUT COMPLICATION, UNSPECIFIED WHETHER LONG TERM INSULIN USE (HCC): Primary | ICD-10-CM

## 2024-10-28 DIAGNOSIS — E78.5 HYPERLIPIDEMIA, UNSPECIFIED HYPERLIPIDEMIA TYPE: ICD-10-CM

## 2024-10-28 DIAGNOSIS — Z91.119 DIETARY NONCOMPLIANCE: ICD-10-CM

## 2024-10-28 LAB — HBA1C MFR BLD: 11.6 %

## 2024-10-28 PROCEDURE — 99214 OFFICE O/P EST MOD 30 MIN: CPT | Performed by: INTERNAL MEDICINE

## 2024-10-28 PROCEDURE — G2211 COMPLEX E/M VISIT ADD ON: HCPCS | Performed by: INTERNAL MEDICINE

## 2024-10-28 PROCEDURE — 3046F HEMOGLOBIN A1C LEVEL >9.0%: CPT | Performed by: INTERNAL MEDICINE

## 2024-10-28 PROCEDURE — 83036 HEMOGLOBIN GLYCOSYLATED A1C: CPT | Performed by: INTERNAL MEDICINE

## 2024-10-28 RX ORDER — METFORMIN HYDROCHLORIDE 500 MG/1
1000 TABLET, EXTENDED RELEASE ORAL
Qty: 360 TABLET | Refills: 3 | Status: SHIPPED | OUTPATIENT
Start: 2024-10-28

## 2024-10-28 RX ORDER — METFORMIN HYDROCHLORIDE 500 MG/1
1000 TABLET, EXTENDED RELEASE ORAL
Qty: 360 TABLET | Refills: 3 | Status: SHIPPED
Start: 2024-10-28 | End: 2024-10-28 | Stop reason: SDUPTHER

## 2024-10-28 NOTE — PROGRESS NOTES
[facility] Department of Endocrinology Diabetes and Metabolism   [address] [place] [address]   Phone: [phone]  Fax: [phone]    Date of Service: 10/28/2024  Primary Care Physician: Leobardo Hahn DO  Provider: Gustavo Bills MD    Reason for the visit:  DM type 2     History of Present Illness:  The history is provided by the patient. No  was used. Accuracy of the patient data is excellent.  Kenney Lopez is a very pleasant 44 y.o. male seen today for diabetes management     Kenney Lopez was diagnosed with diabetes at age 37  and he was supposed to be on Metformin  mg 2 tab daily , Jardiance 25 mg daily   Pt wasn't taking medications for few months   The patient was not able to tolerate Ozempic.  He experienced nausea and vomiting with it.  Lab Results   Component Value Date/Time    LABA1C 11.6 10/28/2024 09:31 AM    LABA1C 8.5 04/16/2024 09:43 AM    LABA1C 8.0 12/11/2023 12:00 AM     Patient has had no hypoglycemic episodes   I reviewed current medications and the patient has no issues with diabetes medications  Kenney Lopez is up to date with eye exam and denied any history of diabetic retinopathy   The patient  performs  own feet care  Microvascular complications:  No Retinopathy, Nephropathy or Neuropathy   Macrovascular complications: no CAD   The patient receives Flushot every year    PGM and MGM DM     PAST MEDICAL HISTORY   No past medical history on file.    PAST SURGICAL HISTORY   No past surgical history on file.    SOCIAL HISTORY   Tobacco:   reports that he has quit smoking. His smoking use included cigarettes. He has never used smokeless tobacco.  Alcohol:   reports that he does not currently use alcohol.  Drugs:   reports no history of drug use.    FAMILY HISTORY   No family history on file.    ALLERGIES AND DRUG REACTIONS   No Known Allergies    CURRENT MEDICATIONS   Current Outpatient Medications

## 2024-10-31 DIAGNOSIS — E11.9 TYPE 2 DIABETES MELLITUS WITHOUT COMPLICATION, UNSPECIFIED WHETHER LONG TERM INSULIN USE (HCC): ICD-10-CM

## 2025-02-19 ENCOUNTER — TELEPHONE (OUTPATIENT)
Dept: ENDOCRINOLOGY | Age: 45
End: 2025-02-19